# Patient Record
Sex: MALE | Race: WHITE
[De-identification: names, ages, dates, MRNs, and addresses within clinical notes are randomized per-mention and may not be internally consistent; named-entity substitution may affect disease eponyms.]

---

## 2018-03-17 ENCOUNTER — HOSPITAL ENCOUNTER (INPATIENT)
Dept: HOSPITAL 17 - NEPC | Age: 63
LOS: 6 days | Discharge: HOME | DRG: 208 | End: 2018-03-23
Attending: HOSPITALIST | Admitting: HOSPITALIST
Payer: SELF-PAY

## 2018-03-17 VITALS
DIASTOLIC BLOOD PRESSURE: 54 MMHG | OXYGEN SATURATION: 99 % | TEMPERATURE: 91.8 F | HEART RATE: 87 BPM | RESPIRATION RATE: 30 BRPM | SYSTOLIC BLOOD PRESSURE: 100 MMHG

## 2018-03-17 VITALS
SYSTOLIC BLOOD PRESSURE: 100 MMHG | OXYGEN SATURATION: 100 % | HEART RATE: 109 BPM | TEMPERATURE: 94.8 F | RESPIRATION RATE: 27 BRPM | DIASTOLIC BLOOD PRESSURE: 52 MMHG

## 2018-03-17 VITALS
SYSTOLIC BLOOD PRESSURE: 96 MMHG | OXYGEN SATURATION: 93 % | TEMPERATURE: 92.3 F | DIASTOLIC BLOOD PRESSURE: 53 MMHG | RESPIRATION RATE: 30 BRPM | HEART RATE: 87 BPM

## 2018-03-17 VITALS
RESPIRATION RATE: 28 BRPM | TEMPERATURE: 99.1 F | HEART RATE: 102 BPM | OXYGEN SATURATION: 100 % | DIASTOLIC BLOOD PRESSURE: 63 MMHG | SYSTOLIC BLOOD PRESSURE: 127 MMHG

## 2018-03-17 VITALS
TEMPERATURE: 97.6 F | DIASTOLIC BLOOD PRESSURE: 61 MMHG | OXYGEN SATURATION: 100 % | HEART RATE: 96 BPM | SYSTOLIC BLOOD PRESSURE: 121 MMHG | RESPIRATION RATE: 28 BRPM

## 2018-03-17 VITALS
TEMPERATURE: 97.6 F | RESPIRATION RATE: 28 BRPM | OXYGEN SATURATION: 100 % | HEART RATE: 96 BPM | DIASTOLIC BLOOD PRESSURE: 60 MMHG | SYSTOLIC BLOOD PRESSURE: 140 MMHG

## 2018-03-17 VITALS — HEART RATE: 86 BPM

## 2018-03-17 VITALS
OXYGEN SATURATION: 100 % | HEART RATE: 103 BPM | DIASTOLIC BLOOD PRESSURE: 58 MMHG | SYSTOLIC BLOOD PRESSURE: 109 MMHG | RESPIRATION RATE: 28 BRPM | TEMPERATURE: 93.7 F

## 2018-03-17 VITALS — SYSTOLIC BLOOD PRESSURE: 143 MMHG | RESPIRATION RATE: 6 BRPM | DIASTOLIC BLOOD PRESSURE: 78 MMHG | HEART RATE: 100 BPM

## 2018-03-17 VITALS — DIASTOLIC BLOOD PRESSURE: 78 MMHG | SYSTOLIC BLOOD PRESSURE: 143 MMHG | HEART RATE: 92 BPM

## 2018-03-17 VITALS — TEMPERATURE: 91.2 F

## 2018-03-17 VITALS — OXYGEN SATURATION: 100 %

## 2018-03-17 VITALS — BODY MASS INDEX: 23.08 KG/M2 | HEIGHT: 72 IN | WEIGHT: 170.42 LBS

## 2018-03-17 DIAGNOSIS — E11.11: ICD-10-CM

## 2018-03-17 DIAGNOSIS — L55.9: ICD-10-CM

## 2018-03-17 DIAGNOSIS — E87.5: ICD-10-CM

## 2018-03-17 DIAGNOSIS — E11.65: ICD-10-CM

## 2018-03-17 DIAGNOSIS — N17.9: ICD-10-CM

## 2018-03-17 DIAGNOSIS — D72.828: ICD-10-CM

## 2018-03-17 DIAGNOSIS — Z79.4: ICD-10-CM

## 2018-03-17 DIAGNOSIS — D64.9: ICD-10-CM

## 2018-03-17 DIAGNOSIS — I25.2: ICD-10-CM

## 2018-03-17 DIAGNOSIS — D75.89: ICD-10-CM

## 2018-03-17 DIAGNOSIS — Z72.0: ICD-10-CM

## 2018-03-17 DIAGNOSIS — Z91.11: ICD-10-CM

## 2018-03-17 DIAGNOSIS — E11.21: ICD-10-CM

## 2018-03-17 DIAGNOSIS — I45.10: ICD-10-CM

## 2018-03-17 DIAGNOSIS — E87.6: ICD-10-CM

## 2018-03-17 DIAGNOSIS — I10: ICD-10-CM

## 2018-03-17 DIAGNOSIS — I21.A1: ICD-10-CM

## 2018-03-17 DIAGNOSIS — E87.0: ICD-10-CM

## 2018-03-17 DIAGNOSIS — N30.90: ICD-10-CM

## 2018-03-17 DIAGNOSIS — Z91.19: ICD-10-CM

## 2018-03-17 DIAGNOSIS — E87.1: ICD-10-CM

## 2018-03-17 DIAGNOSIS — E72.20: ICD-10-CM

## 2018-03-17 DIAGNOSIS — J96.01: Primary | ICD-10-CM

## 2018-03-17 DIAGNOSIS — E83.39: ICD-10-CM

## 2018-03-17 DIAGNOSIS — G92: ICD-10-CM

## 2018-03-17 DIAGNOSIS — F10.10: ICD-10-CM

## 2018-03-17 LAB
ALBUMIN SERPL-MCNC: 3.9 GM/DL (ref 3.4–5)
ALP SERPL-CCNC: 164 U/L (ref 45–117)
ALT SERPL-CCNC: 56 U/L (ref 12–78)
APAP SERPL-MCNC: (no result) MCG/ML (ref 10–30)
AST SERPL-CCNC: 39 U/L (ref 15–37)
BACTERIA #/AREA URNS HPF: (no result) /HPF
BASOPHILS # BLD AUTO: 0.2 TH/MM3 (ref 0–0.2)
BASOPHILS NFR BLD AUTO: 1 % (ref 0–2)
BASOPHILS NFR BLD: 0.4 % (ref 0–2)
BILIRUB SERPL-MCNC: 0.4 MG/DL (ref 0.2–1)
BUN SERPL-MCNC: 43 MG/DL (ref 7–18)
BUN SERPL-MCNC: 44 MG/DL (ref 7–18)
CALCIUM SERPL-MCNC: 6.9 MG/DL (ref 8.5–10.1)
CALCIUM SERPL-MCNC: 8 MG/DL (ref 8.5–10.1)
CALCIUM TP COR SERPL-MCNC: 7.5 MG/DL (ref 8.5–10.1)
CHLORIDE SERPL-SCNC: 102 MEQ/L (ref 98–107)
CHLORIDE SERPL-SCNC: 89 MEQ/L (ref 98–107)
COLOR UR: (no result)
CREAT SERPL-MCNC: 2.26 MG/DL (ref 0.6–1.3)
CREAT SERPL-MCNC: 2.46 MG/DL (ref 0.6–1.3)
EOSINOPHIL # BLD: 0.1 TH/MM3 (ref 0–0.4)
EOSINOPHIL NFR BLD: 0.3 % (ref 0–4)
ERYTHROCYTE [DISTWIDTH] IN BLOOD BY AUTOMATED COUNT: 14.1 % (ref 11.6–17.2)
GFR SERPLBLD BASED ON 1.73 SQ M-ARVRAT: 27 ML/MIN (ref 89–?)
GFR SERPLBLD BASED ON 1.73 SQ M-ARVRAT: 30 ML/MIN (ref 89–?)
GLUCOSE SERPL-MCNC: 1165 MG/DL (ref 74–106)
GLUCOSE SERPL-MCNC: 709 MG/DL (ref 74–106)
GLUCOSE SERPL-MCNC: 813 MG/DL (ref 74–106)
GLUCOSE UR STRIP-MCNC: 1000 MG/DL
HCO3 BLD-SCNC: (no result) MEQ/L (ref 21–32)
HCO3 BLD-SCNC: 5.1 MEQ/L (ref 21–32)
HCT VFR BLD CALC: 49.2 % (ref 39–51)
HGB BLD-MCNC: 14.2 GM/DL (ref 13–17)
HGB UR QL STRIP: (no result)
INR PPP: 1.1 RATIO
KETONES UR STRIP-MCNC: 150 MG/DL
LACTIC ACID SEPSIS PROTOCOL: 4.5 MMOL/L (ref 0.4–2)
LYMPHOCYTES # BLD AUTO: 8 TH/MM3 (ref 1–4.8)
LYMPHOCYTES NFR BLD AUTO: 19.5 % (ref 9–44)
LYMPHOCYTES: 12 % (ref 9–44)
MAGNESIUM SERPL-MCNC: 2.5 MG/DL (ref 1.5–2.5)
MCH RBC QN AUTO: 32.9 PG (ref 27–34)
MCHC RBC AUTO-ENTMCNC: 28.9 % (ref 32–36)
MCV RBC AUTO: 114 FL (ref 80–100)
MONOCYTE #: 2 TH/MM3 (ref 0–0.9)
MONOCYTES NFR BLD: 5 % (ref 0–8)
MONOCYTES: 9 % (ref 0–8)
MUCOUS THREADS #/AREA URNS LPF: (no result) /LPF
NEUTROPHILS # BLD AUTO: 30.6 TH/MM3 (ref 1.8–7.7)
NEUTROPHILS NFR BLD AUTO: 74.8 % (ref 16–70)
NEUTS BAND # BLD MANUAL: 31.2 TH/MM3 (ref 1.8–7.7)
NEUTS BAND NFR BLD: 8 % (ref 0–6)
NEUTS SEG NFR BLD MANUAL: 68 % (ref 16–70)
NITRITE UR QL STRIP: (no result)
PHOSPHATE SERPL-MCNC: 7.8 MG/DL (ref 2.5–4.9)
PLATELET # BLD: 503 TH/MM3 (ref 150–450)
PMV BLD AUTO: 7.8 FL (ref 7–11)
PROT SERPL-MCNC: 6 GM/DL (ref 6.4–8.2)
PROT SERPL-MCNC: 6.9 GM/DL (ref 6.4–8.2)
PROTHROMBIN TIME: 11.4 SEC (ref 9.8–11.6)
RBC # BLD AUTO: 4.31 MIL/MM3 (ref 4.5–5.9)
SODIUM SERPL-SCNC: 127 MEQ/L (ref 136–145)
SODIUM SERPL-SCNC: 135 MEQ/L (ref 136–145)
SP GR UR STRIP: 1.02 (ref 1–1.03)
TROPONIN I SERPL-MCNC: 0.04 NG/ML (ref 0.02–0.05)
TROPONIN I SERPL-MCNC: 0.09 NG/ML (ref 0.02–0.05)
URINE LEUKOCYTE ESTERASE: (no result)
WBC # BLD AUTO: 41 TH/MM3 (ref 4–11)

## 2018-03-17 PROCEDURE — 86403 PARTICLE AGGLUT ANTBDY SCRN: CPT

## 2018-03-17 PROCEDURE — 84155 ASSAY OF PROTEIN SERUM: CPT

## 2018-03-17 PROCEDURE — 94150 VITAL CAPACITY TEST: CPT

## 2018-03-17 PROCEDURE — 82805 BLOOD GASES W/O2 SATURATION: CPT

## 2018-03-17 PROCEDURE — 85007 BL SMEAR W/DIFF WBC COUNT: CPT

## 2018-03-17 PROCEDURE — 76705 ECHO EXAM OF ABDOMEN: CPT

## 2018-03-17 PROCEDURE — 85027 COMPLETE CBC AUTOMATED: CPT

## 2018-03-17 PROCEDURE — 87449 NOS EACH ORGANISM AG IA: CPT

## 2018-03-17 PROCEDURE — 84132 ASSAY OF SERUM POTASSIUM: CPT

## 2018-03-17 PROCEDURE — 95819 EEG AWAKE AND ASLEEP: CPT

## 2018-03-17 PROCEDURE — 0BH17EZ INSERTION OF ENDOTRACHEAL AIRWAY INTO TRACHEA, VIA NATURAL OR ARTIFICIAL OPENING: ICD-10-PCS | Performed by: EMERGENCY MEDICINE

## 2018-03-17 PROCEDURE — 84443 ASSAY THYROID STIM HORMONE: CPT

## 2018-03-17 PROCEDURE — 85610 PROTHROMBIN TIME: CPT

## 2018-03-17 PROCEDURE — 93306 TTE W/DOPPLER COMPLETE: CPT

## 2018-03-17 PROCEDURE — 31500 INSERT EMERGENCY AIRWAY: CPT

## 2018-03-17 PROCEDURE — 82010 KETONE BODYS QUAN: CPT

## 2018-03-17 PROCEDURE — 87086 URINE CULTURE/COLONY COUNT: CPT

## 2018-03-17 PROCEDURE — 74176 CT ABD & PELVIS W/O CONTRAST: CPT

## 2018-03-17 PROCEDURE — 82552 ASSAY OF CPK IN BLOOD: CPT

## 2018-03-17 PROCEDURE — 85025 COMPLETE CBC W/AUTO DIFF WBC: CPT

## 2018-03-17 PROCEDURE — 36556 INSERT NON-TUNNEL CV CATH: CPT

## 2018-03-17 PROCEDURE — 85384 FIBRINOGEN ACTIVITY: CPT

## 2018-03-17 PROCEDURE — 87040 BLOOD CULTURE FOR BACTERIA: CPT

## 2018-03-17 PROCEDURE — 5A1945Z RESPIRATORY VENTILATION, 24-96 CONSECUTIVE HOURS: ICD-10-PCS | Performed by: EMERGENCY MEDICINE

## 2018-03-17 PROCEDURE — 82948 REAGENT STRIP/BLOOD GLUCOSE: CPT

## 2018-03-17 PROCEDURE — 71045 X-RAY EXAM CHEST 1 VIEW: CPT

## 2018-03-17 PROCEDURE — 94640 AIRWAY INHALATION TREATMENT: CPT

## 2018-03-17 PROCEDURE — 87641 MR-STAPH DNA AMP PROBE: CPT

## 2018-03-17 PROCEDURE — 82533 TOTAL CORTISOL: CPT

## 2018-03-17 PROCEDURE — 82140 ASSAY OF AMMONIA: CPT

## 2018-03-17 PROCEDURE — 84300 ASSAY OF URINE SODIUM: CPT

## 2018-03-17 PROCEDURE — 82947 ASSAY GLUCOSE BLOOD QUANT: CPT

## 2018-03-17 PROCEDURE — 83036 HEMOGLOBIN GLYCOSYLATED A1C: CPT

## 2018-03-17 PROCEDURE — 85730 THROMBOPLASTIN TIME PARTIAL: CPT

## 2018-03-17 PROCEDURE — 84439 ASSAY OF FREE THYROXINE: CPT

## 2018-03-17 PROCEDURE — 87070 CULTURE OTHR SPECIMN AEROBIC: CPT

## 2018-03-17 PROCEDURE — 80048 BASIC METABOLIC PNL TOTAL CA: CPT

## 2018-03-17 PROCEDURE — 76937 US GUIDE VASCULAR ACCESS: CPT

## 2018-03-17 PROCEDURE — 83605 ASSAY OF LACTIC ACID: CPT

## 2018-03-17 PROCEDURE — 80061 LIPID PANEL: CPT

## 2018-03-17 PROCEDURE — 87804 INFLUENZA ASSAY W/OPTIC: CPT

## 2018-03-17 PROCEDURE — 82150 ASSAY OF AMYLASE: CPT

## 2018-03-17 PROCEDURE — 4A133B1 MONITORING OF ARTERIAL PRESSURE, PERIPHERAL, PERCUTANEOUS APPROACH: ICD-10-PCS | Performed by: INTERNAL MEDICINE

## 2018-03-17 PROCEDURE — 87205 SMEAR GRAM STAIN: CPT

## 2018-03-17 PROCEDURE — 83735 ASSAY OF MAGNESIUM: CPT

## 2018-03-17 PROCEDURE — 04HY32Z INSERTION OF MONITORING DEVICE INTO LOWER ARTERY, PERCUTANEOUS APPROACH: ICD-10-PCS | Performed by: INTERNAL MEDICINE

## 2018-03-17 PROCEDURE — 94664 DEMO&/EVAL PT USE INHALER: CPT

## 2018-03-17 PROCEDURE — 84484 ASSAY OF TROPONIN QUANT: CPT

## 2018-03-17 PROCEDURE — 93005 ELECTROCARDIOGRAM TRACING: CPT

## 2018-03-17 PROCEDURE — 81001 URINALYSIS AUTO W/SCOPE: CPT

## 2018-03-17 PROCEDURE — 70450 CT HEAD/BRAIN W/O DYE: CPT

## 2018-03-17 PROCEDURE — 82550 ASSAY OF CK (CPK): CPT

## 2018-03-17 PROCEDURE — 83690 ASSAY OF LIPASE: CPT

## 2018-03-17 PROCEDURE — 4A133J1 MONITORING OF ARTERIAL PULSE, PERIPHERAL, PERCUTANEOUS APPROACH: ICD-10-PCS | Performed by: INTERNAL MEDICINE

## 2018-03-17 PROCEDURE — 05HN33Z INSERTION OF INFUSION DEVICE INTO LEFT INTERNAL JUGULAR VEIN, PERCUTANEOUS APPROACH: ICD-10-PCS | Performed by: INTERNAL MEDICINE

## 2018-03-17 PROCEDURE — 94002 VENT MGMT INPAT INIT DAY: CPT

## 2018-03-17 PROCEDURE — 36600 WITHDRAWAL OF ARTERIAL BLOOD: CPT

## 2018-03-17 PROCEDURE — 86022 PLATELET ANTIBODIES: CPT

## 2018-03-17 PROCEDURE — 80307 DRUG TEST PRSMV CHEM ANLYZR: CPT

## 2018-03-17 PROCEDURE — 82570 ASSAY OF URINE CREATININE: CPT

## 2018-03-17 PROCEDURE — 80053 COMPREHEN METABOLIC PANEL: CPT

## 2018-03-17 PROCEDURE — 84100 ASSAY OF PHOSPHORUS: CPT

## 2018-03-17 PROCEDURE — 94003 VENT MGMT INPAT SUBQ DAY: CPT

## 2018-03-17 PROCEDURE — 80074 ACUTE HEPATITIS PANEL: CPT

## 2018-03-17 RX ADMIN — TAZOBACTAM SODIUM AND PIPERACILLIN SODIUM SCH MLS/HR: 250; 2 INJECTION, SOLUTION INTRAVENOUS at 20:49

## 2018-03-17 RX ADMIN — THIAMINE HYDROCHLORIDE SCH MLS/HR: 100 INJECTION, SOLUTION INTRAMUSCULAR; INTRAVENOUS at 20:53

## 2018-03-17 RX ADMIN — FAMOTIDINE SCH MG: 10 INJECTION, SOLUTION INTRAVENOUS at 20:48

## 2018-03-17 RX ADMIN — IPRATROPIUM BROMIDE AND ALBUTEROL SULFATE SCH AMPULE: .5; 3 SOLUTION RESPIRATORY (INHALATION) at 23:30

## 2018-03-17 RX ADMIN — TAZOBACTAM SODIUM AND PIPERACILLIN SODIUM SCH MLS/HR: 250; 2 INJECTION, SOLUTION INTRAVENOUS at 13:51

## 2018-03-17 RX ADMIN — Medication SCH ML: at 20:47

## 2018-03-17 RX ADMIN — CHLORHEXIDINE GLUCONATE SCH PACK: 500 CLOTH TOPICAL at 20:53

## 2018-03-17 RX ADMIN — WATER SCH ML: 1 IRRIGANT IRRIGATION at 20:48

## 2018-03-17 RX ADMIN — PHENYLEPHRINE HYDROCHLORIDE PRN MLS/HR: 10 INJECTION INTRAVENOUS at 17:03

## 2018-03-17 RX ADMIN — STANDARDIZED SENNA CONCENTRATE AND DOCUSATE SODIUM SCH TAB: 8.6; 5 TABLET, FILM COATED ORAL at 20:49

## 2018-03-17 RX ADMIN — IPRATROPIUM BROMIDE AND ALBUTEROL SULFATE SCH AMPULE: .5; 3 SOLUTION RESPIRATORY (INHALATION) at 20:23

## 2018-03-17 RX ADMIN — CHLORHEXIDINE GLUCONATE 0.12% ORAL RINSE SCH ML: 1.2 LIQUID ORAL at 20:51

## 2018-03-17 RX ADMIN — PHENYTOIN SODIUM SCH MLS/HR: 50 INJECTION INTRAMUSCULAR; INTRAVENOUS at 12:50

## 2018-03-17 RX ADMIN — PHENYTOIN SODIUM SCH MLS/HR: 50 INJECTION INTRAMUSCULAR; INTRAVENOUS at 13:26

## 2018-03-17 RX ADMIN — MIDAZOLAM HYDROCHLORIDE PRN MLS/HR: 5 INJECTION, SOLUTION INTRAMUSCULAR; INTRAVENOUS at 17:07

## 2018-03-17 RX ADMIN — SODIUM BICARBONATE SCH MLS/HR: 84 INJECTION, SOLUTION INTRAVENOUS at 22:26

## 2018-03-17 RX ADMIN — DEXTROSE MONOHYDRATE PRN MLS/HR: 10 INJECTION, SOLUTION INTRAVENOUS at 13:47

## 2018-03-17 RX ADMIN — POLYVINYL ALCOHOL SCH DROP: 14 SOLUTION/ DROPS OPHTHALMIC at 18:00

## 2018-03-17 RX ADMIN — TAZOBACTAM SODIUM AND PIPERACILLIN SODIUM SCH MLS/HR: 250; 2 INJECTION, SOLUTION INTRAVENOUS at 13:45

## 2018-03-17 NOTE — PD.PROCEDR
Procedure Note


Procedure


DATE: 3/17/18





PROCEDURE: Right femoral arterial catheter placement





INDICATION: Hemodynamic access





DETAILS OF PROCEDURE


The patient was placed in supine  position.  The skin was cleansed with 

Chloraprep.   Additional barrier precautions included large sterile drape, 

sterile gloves, sterile gown,  face mask, and hat. 1% lidocaine was used for 

local anesthesia.  Under direct ultrasound guidance and on the initial attempt, 

the artery was accessed with an introducer needle. The guide wire was advanced. 

Using Seldinger technique 20 gauge arterial catheter was placed.  The guide 

wire was removed.  The catheter was connected to a transducer line and flushed 

with saline. The video monitor displayed normal arterial wave forms. The 

catheter was secured with 2-0 silk. A sterile dressing with antibiotic disc was 

applied.   





ESTIMATED BLOOD LOSS: minimal





COMPLICATIONS:  None











Avel Moya MD Mar 17, 2018 15:31

## 2018-03-17 NOTE — RADRPT
EXAM DATE/TIME:  03/17/2018 13:50 

 

[facility] COMPARISON:     

No previous studies available for comparison.

        

 

 

INDICATIONS :     

Enlarged liver. 

                     

 

MEDICAL HISTORY :           

Diabetes. Tobacco use. 

 

SURGICAL HISTORY :     

None.     

 

ENCOUNTER:     

Initial

 

ACUITY:     

1 day

 

PAIN SCORE:     

Nonresponsive.

 

LOCATION:     

Bilateral upper quadrant 

                     

MEASUREMENTS:     

 

LIVER:     

16.9 cm length 

 

COMMON DUCT:     

5 mm

 

RIGHT KIDNEY:     

13.1 x 6.1 x 5.4 cm

 

SPLEEN:     

11.6 cm length

 

FINDINGS:     

 

LIVER:     

Normal echotexture without focal lesion or ductal dilatation.  

 

COMMON DUCT:     

No intraluminal mass or stone visualized.  

 

GALLBLADDER:     

Contains no stones, demonstrates no wall thickening or pericholecystic fluid.  

 

PANCREAS:     

The visualized portions are within normal limits.  

 

KIDNEY:     

Right kidney is normal in size and echogenicity without evidence of hydronephrosis or concerning mass
. The left kidney demonstrates 2 large cysts with the largest cyst identified within the lower pole m
easuring 9.0 x 9.7 x 6.5 cm. There is a smaller well-circumscribed cyst at the midpole measuring 5.6 
cm in greatest dimension. No evidence of hydronephrosis.

 

SPLEEN:     

No focal lesion.  

 

 

CONCLUSION:     

The liver is normal in size and echogenicity. No evidence of mass. There are large but benign-appeari
ng left renal cyst present..

 

 

 

 Kori Hernandez MD on March 17, 2018 at 14:17           

Board Certified Radiologist.

 This report was verified electronically.

## 2018-03-17 NOTE — HHI.HP
[facility]


Service


Critical Care Medicine


Primary Care Physician


Unknown


Admission Diagnosis





DKA, metabolic acidosis, metabolic encephalopathy


Diagnosis:  


(1) Acute respiratory failure


Diagnosis:  Principal





(2) Thrombocytosis


Diagnosis:  Secondary





(3) Macrocytosis


Diagnosis:  Secondary





(4) Increased ammonia level


Diagnosis:  Principal





(5) Leukocytosis


Diagnosis:  Principal





(6) Glycosuria


Diagnosis:  Secondary





(7) Acute metabolic encephalopathy


Diagnosis:  Principal





(8) Cystitis


Diagnosis:  Principal





(9) Acute kidney injury


Diagnosis:  Principal





(10) Hyperkalemia


Diagnosis:  Principal





(11) Hyponatremia


Diagnosis:  Secondary





(12) Hyperglycemia due to type 2 diabetes mellitus


Diagnosis:  Principal





Chief Complaint:  


Found in hotel room by friend, unresponsive


Travel History


International Travel<30 Days:  No


Contact w/Intl Traveler <30 Da:  No


Traveled to Known Affected Are:  No


History of Present Illness


62-year-old  male.  Date of admission 3/17/2018.  Past medical history 

includes diabetes according to friend per ER physician who is currently 

unavailable.  This individual presented to the emergency room by EMS after he 

was found unresponsive this morning by his friend in the hotel room.   When EMS 

arrived and checked his blood sugar the meter read high.  They brought him 

unresponsive with GCS of 3.  Vital signs were otherwise stable.  Patient was in 

no condition to give any meaningful history.  The bedside blood sugar in the 

emergency room read high as well.  There is no family or friend currently with 

the patient.  He was last seen normal last night. 





Patient received 20 mg etomidate and 100 mg succinylcholine was extubated with 

a 7.5 ET tube.  CT brain revealed no acute intracranial findings.  His white 

blood cell count is 41,000.  He has a macrocytosis on his lab draws ammonia 

levels 161.  Received  vancomycin and piperacillin/tazobactam in the emergency 

department.  He was started on lactulose 30 cc 4 times daily.  EKG revealed 

normal sinus rhythm 83.  Incomplete right bundle branch block.  Troponin is 

currently pending.  Creatinine was 2.4.  Potassium is 6.3.  Sodium was 127.  

Troponin 0 0.04.  TSH 1.19.  Patient received 10 mg insulin R and started on a 

drip currently 8 units an hour.  3 L normal saline wide open.  Currently normal 

saline at 250 cc now.  We are asked to admit.





Review of Systems


ROS Limitations:  Intubated





Past Family Social History


Allergies:  


Coded Allergies:  


     No Allergy Information Available (Unverified , 3/17/18)


Past Medical History


Diabetes mellitus


Past Surgical History


Unknown


Reported Medications


Unknown


Active Ordered Medications


Reviewed in EMR


Family History


Unknown


Social History


Unknown





Physical Exam


Vital Signs





Vital Signs








  Date Time  Temp Pulse Resp B/P (MAP) Pulse Ox O2 Delivery O2 Flow Rate FiO2


 


3/17/18 12:16  92  143/78 (99)    


 


3/17/18 12:13  100 6 143/78 (99)  Non-Rebreather  


 


3/17/18 12:11        100








Physical Exam


GENERAL: 62-year-old  male currently orotracheally intubated


SKIN: Warm and dry.  Face and upper extremity are sunburned


HEAD: Atraumatic. Normocephalic. 


EYES: Pupils equal and round about 4 mm bilaterally and reactive. No scleral 

icterus. No injection or drainage. 


ENT: No nasal bleeding or discharge.  Mucous membranes pink and moist.


NECK: Trachea midline. No JVD. 


CARDIOVASCULAR: Regular rate and rhythm.  S1, S2.  No S4.  No murmur


RESPIRATORY: Tachypneic, using accessory muscles.  No wheezing appreciated.


GASTROINTESTINAL: Abdomen soft, non-tender, nondistended.  Hypoactive bowel 

sounds are appreciated


MUSCULOSKELETAL: Extremities without significant peripheral edema. No obvious 

deformities. 


NEUROLOGICAL: Cranial nerves II through XII grossly intact.  Positive gag and 

corneal reflex.  Positive cough.  Currently without withdrawing to pain.


Laboratory





Laboratory Tests








Test


  3/17/18


12:15 3/17/18


12:25


 


Blood Gas Puncture Site IV  


 


Blood Gas Patient Temperature 98.6  


 


Blood Gas HCO3 3  


 


Blood Gas Base Excess -29.0  


 


Blood Gas Oxygen Saturation 85  


 


Arterial Blood pH 6.71  


 


Arterial Blood Partial


Pressure CO2 22 


  


 


 


Arterial Blood Partial


Pressure O2 72 


  


 


 


Arterial Blood Oxygen Content 17.9  


 


Arterial Blood


Carboxyhemoglobin 0.9 


  


 


 


Arterial Blood Methemoglobin 1.3  


 


Venous Blood pH 6.71  


 


Venous Blood Partial Pressure


CO2 22 


  


 


 


Venous Blood Partial Pressure


O2 72 


  


 


 


Venous Blood HCO3 3  


 


Venous Blood Oxygen Saturation 85  


 


Venous Blood Oxygen Content 17.9  


 


Venous Blood Base Excess -29.0  


 


Blood Gas Hemoglobin 15.0  


 


Oxygen Delivery Device BiPAP  


 


Blood Gas Liter Flow 15  


 


Blood Gas Ventilator Setting NR  


 


White Blood Count  41.0 


 


Red Blood Count  4.31 


 


Hemoglobin  14.2 


 


Hematocrit  49.2 


 


Mean Corpuscular Volume  114.0 


 


Mean Corpuscular Hemoglobin  32.9 


 


Mean Corpuscular Hemoglobin


Concent 


  28.9 


 


 


Red Cell Distribution Width  14.1 


 


Platelet Count  503 


 


Mean Platelet Volume  7.8 


 


Neutrophils (%) (Auto)  74.8 


 


Lymphocytes (%) (Auto)  19.5 


 


Monocytes (%) (Auto)  5.0 


 


Eosinophils (%) (Auto)  0.3 


 


Basophils (%) (Auto)  0.4 


 


Neutrophils # (Auto)  30.6 


 


Lymphocytes # (Auto)  8.0 


 


Monocytes # (Auto)  2.0 


 


Eosinophils # (Auto)  0.1 


 


Basophils # (Auto)  0.2 


 


CBC Comment  AUTO DIFF 


 


Differential Total Cells


Counted 


  100 


 


 


Neutrophils % (Manual)  68 


 


Band Neutrophils %  8 


 


Lymphocytes %  12 


 


Monocytes %  9 


 


Eosinophils %  2 


 


Basophils %  1 


 


Neutrophils # (Manual)  31.2 


 


Differential Comment


  


  FINAL DIFF


MANUAL


 


Platelet Estimate  HIGH 


 


Platelet Morphology Comment  NORMAL 


 


Prothrombin Time  11.4 


 


Prothromb Time International


Ratio 


  1.1 


 


 


Urine Color  LIGHT-YELLOW 


 


Urine Turbidity  CLEAR 


 


Urine pH  5.0 


 


Urine Specific Gravity  1.022 


 


Urine Protein  30 


 


Urine Glucose (UA)  1000 


 


Urine Ketones  150 


 


Urine Occult Blood  SMALL 


 


Urine Nitrite  NEG 


 


Urine Bilirubin  NEG 


 


Urine Urobilinogen  LESS THAN 2.0 


 


Urine Leukocyte Esterase  NEG 


 


Urine RBC  1 


 


Urine WBC  1 


 


Urine Bacteria  RARE 


 


Urine Mucus  FEW 


 


Microscopic Urinalysis Comment


  


  CATH-CULTURE


IND


 


Ammonia  161 


 


Salicylates Level  5.9 














 Date/Time


Source Procedure


Growth Status


 


 


 3/17/18 12:25


Blood Peripheral Aerobic Blood Culture


Pending Received


 


 3/17/18 12:25


Blood Peripheral Anaerobic Blood Culture


Pending Received


 


 3/17/18 12:25


Urine Clean Catch Urine Culture


Pending Received








Result Diagram:  


3/17/18 1225





Imaging


CT brain -no acute intracranial findings


Chest x-ray -ET tube high with advanced 2 cm.  NG tube in place.  No acute 

pulmonary findings.





Septic Shock Reassessment


Septic shock perfusion:  reassessment completed





Caprini VTE Risk Assessment


Caprini VTE Risk Assessment:  Mod/High Risk (score >= 2)


Caprini Risk Assessment Model











 Point Value = 1          Point Value = 2  Point Value = 3  Point Value = 5


 


Age 41-60


Minor surgery


BMI > 25 kg/m2


Swollen legs


Varicose veins


Pregnancy or postpartum


History of unexplained or recurrent


   spontaneous 


Oral contraceptives or hormone


   replacement


Sepsis (< 1 month)


Serious lung disease, including


   pneumonia (< 1 month)


Abnormal pulmonary function


Acute myocardial infarction


Congestive heart failure (< 1 month)


History of inflammatory bowel disease


Medical patient at bed rest Age 61-74


Arthroscopic surgery


Major open surgery (> 45 min)


Laparoscopic surgery (> 45 min)


Malignancy


Confined to bed (> 72 hours)


Immobilizing plaster cast


Central venous access Age >= 75


History of VTE


Family history of VTE


Factor V Leiden


Prothrombin 22658E


Lupus anticoagulant


Anticardiolipin antibodies


Elevated serum homocysteine


Heparin-induced thrombocytopenia


Other congenital or acquired


   thrombophilia Stroke (< 1 month)


Elective arthroplasty


Hip, pelvis, or leg fracture


Acute spinal cord injury (< 1 month)








Prophylaxis Regimen











   Total Risk


Factor Score Risk Level Prophylaxis Regimen


 


0-1      Low Early ambulation


 


2 Moderate Order ONE of the following:


*Sequential Compression Device (SCD)


*Heparin 5000 units SQ BID


 


3-4 Higher Order ONE of the following medications:


*Heparin 5000 units SQ TID


*Enoxaparin/Lovenox 40 mg SQ daily (WT < 150 kg, CrCl > 30 mL/min)


*Enoxaparin/Lovenox 30 mg SQ daily (WT < 150 kg, CrCl > 10-29 mL/min)


*Enoxaparin/Lovenox 30 mg SQ BID (WT < 150 kg, CrCl > 30 mL/min)


AND/OR


*Sequential Compression Device (SCD)


 


5 or more Highest Order ONE of the following medications:


*Heparin 5000 units SQ TID (Preferred with Epidurals)


*Enoxaparin/Lovenox 40 mg SQ daily (WT < 150 kg, CrCl > 30 mL/min)


*Enoxaparin/Lovenox 30 mg SQ daily (WT < 150 kg, CrCl > 10-29 mL/min)


*Enoxaparin/Lovenox 30 mg SQ BID (WT < 150 kg, CrCl > 30 mL/min)


AND


*Sequential Compression Device (SCD)











Assessment and Plan


Assessment and Plan


Neuro/Psych:





Acute toxic metabolic encephalopathy secondary hyperglycemia and elevated 

ammonia





Currently on propofol/fentanyl drips for sedation/analgesia while intubated


Goal of RA SS of -2


Daily sedation vacation


CT brain 3/17 revealed no acute intracranial findings


Ammonia level is 161.  See GI


Thiamine 100 mg IV daily, folate 1 mg daily multiple tablet daily with 

macrocytosis


 


CV: 





Hypertension





Currently on normal saline at 250 cc an hour


Currently not requiring vasopressors and/or antihypertensives


Lactate is currently pending








Resp: 





Acute hypoxemic respiratory failure





PRVC 30/500/0.75/5/100


Ventilator bundle


Albuterol/ipratropium aerosols every 6 hours with albuterol aerosols every 2 

hours needed for dyspnea


Spontaneous breathing trials when clinically indicated


Chest x-ray post intubation reveals no acute cardiopulmonary findings





GI: 





Hyperammonia


Elevated AST





N.p.o.


NGT to LIWS


Famotidine for GI prophylaxis


Docusate sodium/senna 1 tablet twice daily for bowel regimen


Lactulose 30 cc every 6 hours for elevated ammonia level


Liver ultrasound ordered.  Hepatitis panel ordered.  CPK ordered





:  





Guerrero catheter has been placed for accurate I's and O's in a critically ill 

patient








Endo:  





Hyperglycemia





Received 10 minutes to our insulin ED.  Currently on insulin drip at 8 units an 

hour.  Goal increased blood sugar by  every hour


BMP, mag and proximal every 6 hours with acetone every 12 hours


Attempt to reconcile home medications


Continue normal saline at 250 cc an hour.  Transition to D5 one half normal 

saline at 200 cc an hour once acetone cleared





Renal: 





Glycosuria


Proteinuria


Acute kidney injury





Patient currently on aggressive crystalloid resuscitation


Check urine eosinophils and electrolytes





Heme:





Leukocytosis neutrophil predominant


Macrocytosis


Thrombocytosis





Monitor CBC daily.  Follow trends








ID:





Cystitis





Currently piperacillin/tazobactam day #1


Received 1 dose of vancomycin ED





Blood cultures 2, urine culture 3/17 pending





FEN:





Pseudohyponatremia


Hyperkalemia





Replace electrolytes as clinically indicated


Recheck potassium in 6 hours.  Along with magnesium and phosphorus





MSK:





PT evaluate and treat





Access


-Utilize peripheral IV.  Central line if indicated





Prophylaxis


-GI -famotidine


-DVT -SCD/heparin subcu





Critical Care:


The total critical care time was 35 minutes. Time to perform other separately 

billable procedures was not included in the critical care time.


Code Status


Full code


Discussed Condition With


ED physician Dr. Torres.  No other family or friends available.  Care plan 

discussed and all questions answered.





Problem Qualifiers





(1) Acute respiratory failure:  


Qualified Codes:  J96.00 - Acute respiratory failure, unspecified whether with 

hypoxia or hypercapnia


(2) Leukocytosis:  


Qualified Codes:  D72.828 - Other elevated white blood cell count


(3) Hyperglycemia due to type 2 diabetes mellitus:  


Qualified Codes:  E11.65 - Type 2 diabetes mellitus with hyperglycemia








Avel Moya MD Mar 17, 2018 13:23

## 2018-03-17 NOTE — PD
HPI


Chief Complaint:  Altered Mental Status


Time Seen by Provider:  12:17


Travel History


International Travel<30 days:  No


Contact w/Intl Traveler<30days:  No


Traveled to known affect area:  No





History of Present Illness


HPI


62-year-old male was brought to the emergency room by EMS after he was found 

unresponsive this morning by his friend in the hotel room.  Patient is from out 

of town and is here for the bike week.  He has history of diabetes.  When EMS 

arrived and checked his blood sugar the meter read high.  They brought him 

unresponsive with GCS of 3.  Vital signs were otherwise stable.  Patient was in 

no condition to give any meaningful history.  The bedside blood sugar in the 

emergency room read high as well.  There is no family or friend currently with 

the patient.  He was last seen normal last night.  No known history of abnormal 

alcohol intake or illicit drugs.





[facility]


Past Medical History


*** Narrative Medical


List of his past medical, surgical, social and family history reviewed from the 

nursing note.





Social History


Tobacco Use:  Yes





Allergies-Medications


(Allergen,Severity, Reaction):  


Coded Allergies:  


     No Allergy Information Available (Unverified , 3/17/18)


Comments


Unknown


Narrative Medication


Unknown





Review of Systems


ROS Limitations:  Unresponsive


Except as stated in HPI:  all other systems reviewed are Neg





Physical Exam


Exam Limitations:  Altered Mental Status


Narrative


GENERAL: Unresponsive, significant


SKIN: Focused skin assessment warm/dry.


HEAD: Atraumatic. Normocephalic. 


EYES: Pupils equal and round, 4 mm equal and reactive to light. No scleral 

icterus. No injection or drainage. 


ENT: No nasal bleeding or discharge.  Dry mucous membrane and tongue


NECK: Trachea midline. No JVD. 


CARDIOVASCULAR: Regular rate and rhythm.  No murmur appreciated.


RESPIRATORY: No accessory muscle use.  Tachypneic.  Clear to auscultation. 

Breath sounds equal bilaterally. 


GASTROINTESTINAL: Abdomen soft, non-tender, nondistended. Hepatic and splenic 

margins not palpable. 


MUSCULOSKELETAL: No obvious deformities. No clubbing.  No cyanosis.  No edema. 


NEUROLOGICAL: GCS of 3


PSYCHIATRIC: Unable to assess





Data


Data


Last Documented VS





Orders





 Orders


Propofol 500 Mg/50 Ml Inj (Diprivan 500 (3/17/18 12:11)


Electrocardiogram (3/17/18 12:17)


Ammonia (3/17/18 12:17)


Complete Blood Count With Diff (3/17/18 12:17)


Comprehensive Metabolic Panel (3/17/18 12:17)


Creatine Kinase (Cpk) (3/17/18 12:17)


Prothrombin Time / Inr (Pt) (3/17/18 12:17)


Troponin I (3/17/18 12:17)


Thyroid Stimulating Hormone (3/17/18 12:17)


Urinalysis - C+S If Indicated (3/17/18 12:17)


Lactic Acid Sepsis Protocol (3/17/18 12:17)


Arterial Blood Gas (Abg) (3/17/18 12:17)


Blood Culture (3/17/18 12:17)


Chest, Single Ap (3/17/18 12:17)


Ct Brain W/O Iv Contrast(Rout) (3/17/18 12:17)


Blood Glucose (3/17/18 12:17)


Ecg Monitoring (3/17/18 12:17)


Iv Access Insert/Monitor (3/17/18 12:17)


Oximetry (3/17/18 12:17)


Sodium Chloride 0.9% Flush (Ns Flush) (3/17/18 12:30)


Sodium Chlor 0.9% 1000 Ml Inj (Ns 1000 M (3/17/18 12:17)


Drug Screen, Random Urine (3/17/18 12:17)


Alcohol (Ethanol) (3/17/18 12:17)


Tylenol (Acetaminophen) (3/17/18 12:17)


Salicylates (Aspirin) (3/17/18 12:17)


Sodium Chlor 0.9% 1000 Ml Inj (Ns 1000 M (3/17/18 12:30)


Blood Gas Venous (Vbg) (3/17/18 12:17)


Insulin Human Regular Inj (Novolin R Inj (3/17/18 12:30)


Urinary Catheter Insert/Apply (3/17/18 12:19)


^ Orogastric Tube (3/17/18 12:19)


Succinylcholine Inj (Quelicin Inj) (3/17/18 12:30)


Etomidate Inj (Amidate Inj) (3/17/18 12:30)


Propofol 1000 Mg/100 Ml Inj (Diprivan 10 (3/17/18 12:30)


Insert Temp Sensing Guerrero Cath (3/17/18 12:26)


Cardiac Monitor / Telemetry [id number] (3/17/18 12:26)


^ Insert Iv (3/17/18 12:26)


Diet Npo (3/17/18 Lunch)


Sodium Chlor 0.9% 1000 Ml Inj (Ns 1000 M (3/17/18 12:26)


Sodium Chlor 0.9% 1000 Ml Inj (Ns 1000 M (3/17/18 12:26)


Dext 5%-Nacl 0.9% 1000 Ml Inj (D5w-Ns 10 (3/17/18 12:26)


Insulin Regular (Iv Infusion) (Novolin R (3/17/18 13:00)


Potassium Chlor 40 Meq Premix (Kcl 40 Me (3/17/18 12:30)


Potassium Chlor 40 Meq Premix (Kcl 40 Me (3/17/18 12:30)


Potassium Chlor 20 Meq Premix (Kcl 20 Me (3/17/18 12:30)


Sodium Bicarbonate 8.4% Inj (Sodium Bica (3/17/18 12:30)


Sodium Bicarbonate 8.4% Inj (Sodium Bica (3/17/18 12:30)


Hemoglobin (Hgb) A1c (3/17/18 12:26)


Basic Metabolic Panel (Bmp) (3/17/18 17:26)


Basic Metabolic Panel (Bmp) (3/17/18 23:26)


Basic Metabolic Panel (Bmp) (3/18/18 05:26)


Basic Metabolic Panel (Bmp) (3/18/18 11:26)


Magnesium (Mg) (3/17/18 17:26)


Magnesium (Mg) (3/17/18 23:26)


Magnesium (Mg) (3/18/18 05:26)


Magnesium (Mg) (3/18/18 11:26)


Phosphorus (Po4) (3/17/18 17:26)


Phosphorus (Po4) (3/17/18 23:26)


Phosphorus (Po4) (3/18/18 05:26)


Phosphorus (Po4) (3/18/18 11:26)


Beta Hydroxybutyrate (Acetone) (3/17/18 23:26)


Beta Hydroxybutyrate (Acetone) (3/18/18 11:26)


Admit Order (Ed Use Only) (3/17/18 12:37)


Protein Corrected Calcium(Pcc) (3/17/18 15:05)





Labs





Laboratory Tests








Test


  3/17/18


12:15 3/17/18


12:25


 


Blood Gas Puncture Site IV  


 


Blood Gas Patient Temperature 98.6  


 


Blood Gas HCO3 3 mmol/L  


 


Blood Gas Base Excess -29.0 mmol/L  


 


Blood Gas Oxygen Saturation 85 %  


 


Arterial Blood pH 6.71  


 


Arterial Blood Partial


Pressure CO2 22 mmHg 


  


 


 


Arterial Blood Partial


Pressure O2 72 mmHG 


  


 


 


Arterial Blood Oxygen Content 17.9 Vol %  


 


Arterial Blood


Carboxyhemoglobin 0.9 % 


  


 


 


Arterial Blood Methemoglobin 1.3 %  


 


Venous Blood pH 6.71  


 


Venous Blood Partial Pressure


CO2 22 mmHg 


  


 


 


Venous Blood Partial Pressure


O2 72 mmHg 


  


 


 


Venous Blood HCO3 3 mmol/L  


 


Venous Blood Oxygen Saturation 85 %  


 


Venous Blood Oxygen Content 17.9 Vol %  


 


Venous Blood Base Excess -29.0 mmol/L  


 


Blood Gas Hemoglobin 15.0 G/DL  


 


Oxygen Delivery Device BiPAP  


 


Blood Gas Liter Flow 15 L/M  


 


Blood Gas Ventilator Setting NR  


 


White Blood Count  41.0 TH/MM3 


 


Red Blood Count  4.31 MIL/MM3 


 


Hemoglobin  14.2 GM/DL 


 


Hematocrit  49.2 % 


 


Mean Corpuscular Volume  114.0 FL 


 


Mean Corpuscular Hemoglobin  32.9 PG 


 


Mean Corpuscular Hemoglobin


Concent 


  28.9 % 


 


 


Red Cell Distribution Width  14.1 % 


 


Platelet Count  503 TH/MM3 


 


Mean Platelet Volume  7.8 FL 


 


Neutrophils (%) (Auto)  74.8 % 


 


Lymphocytes (%) (Auto)  19.5 % 


 


Monocytes (%) (Auto)  5.0 % 


 


Eosinophils (%) (Auto)  0.3 % 


 


Basophils (%) (Auto)  0.4 % 


 


Neutrophils # (Auto)  30.6 TH/MM3 


 


Lymphocytes # (Auto)  8.0 TH/MM3 


 


Monocytes # (Auto)  2.0 TH/MM3 


 


Eosinophils # (Auto)  0.1 TH/MM3 


 


Basophils # (Auto)  0.2 TH/MM3 


 


CBC Comment  AUTO DIFF 


 


Differential Total Cells


Counted 


  100 


 


 


Neutrophils % (Manual)  68 % 


 


Band Neutrophils %  8 % 


 


Lymphocytes %  12 % 


 


Monocytes %  9 % 


 


Eosinophils %  2 % 


 


Basophils %  1 % 


 


Neutrophils # (Manual)  31.2 TH/MM3 


 


Differential Comment


  


  FINAL DIFF


MANUAL


 


Platelet Estimate  HIGH 


 


Platelet Morphology Comment  NORMAL 


 


Prothrombin Time  11.4 SEC 


 


Prothromb Time International


Ratio 


  1.1 RATIO 


 


 


Urine Color  LIGHT-YELLOW 


 


Urine Turbidity  CLEAR 


 


Urine pH  5.0 


 


Urine Specific Gravity  1.022 


 


Urine Protein  30 mg/dL 


 


Urine Glucose (UA)  1000 mg/dL 


 


Urine Ketones  150 mg/dL 


 


Urine Occult Blood  SMALL 


 


Urine Nitrite  NEG 


 


Urine Bilirubin  NEG 


 


Urine Urobilinogen


  


  LESS THAN 2.0


MG/DL


 


Urine Leukocyte Esterase  NEG 


 


Urine RBC  1 /hpf 


 


Urine WBC  1 /hpf 


 


Urine Bacteria  RARE /hpf 


 


Urine Mucus  FEW /lpf 


 


Microscopic Urinalysis Comment


  


  CATH-CULTURE


IND


 


Urine Random Creatinine  18.7 MG/DL 


 


Urine Random Sodium  45 MEQ/L 


 


Blood Urea Nitrogen  44 MG/DL 


 


Creatinine  2.46 MG/DL 


 


Random Glucose  1165 MG/DL 


 


Total Protein  6.9 GM/DL 


 


Albumin  3.9 GM/DL 


 


Calcium Level  8.0 MG/DL 


 


Alkaline Phosphatase  164 U/L 


 


Aspartate Amino Transf


(AST/SGOT) 


  39 U/L 


 


 


Alanine Aminotransferase


(ALT/SGPT) 


  56 U/L 


 


 


Total Bilirubin  0.4 MG/DL 


 


Sodium Level  127 MEQ/L 


 


Potassium Level  6.3 MEQ/L 


 


Chloride Level  89 MEQ/L 


 


Carbon Dioxide Level


  


  LESS THAN 5.0


MEQ/L


 


Anion Gap  33 MEQ/L 


 


Estimat Glomerular Filtration


Rate 


  27 ML/MIN 


 


 


Lactic Acid Level  4.5 mmol/L 


 


Ammonia  161 MCMOL/L 


 


Total Creatine Kinase  273 U/L 


 


Troponin I  0.04 NG/ML 


 


Thyroid Stimulating Hormone


3rd Gen 


  1.090 uIU/ML 


 


 


Salicylates Level  5.9 MG/DL 


 


Urine Opiates Screen  NEG 


 


Acetaminophen Level


  


  LESS THAN 2.0


MCG/ML


 


Urine Barbiturates Screen  NEG 


 


Urine Amphetamines Screen  NEG 


 


Urine Benzodiazepines Screen  NEG 


 


Urine Cocaine Screen  NEG 


 


Urine Cannabinoids Screen  NEG 


 


Ethyl Alcohol Level


  


  LESS THAN 3


MG/DL


 


B-Hydroxybutyrate  14.74 MMOL/L 











MDM


Medical Decision Making


Medical Screen Exam Complete:  Yes


Emergency Medical Condition:  Yes


Medical Record Reviewed:  Yes


Interpretation(s)


Twelve-lead EKG was reviewed by me.  Normal sinus rhythm, normal axis, 

intraventricular conduction delay, motion artifact.  Heart rate of 86 bpm.


Differential Diagnosis


DKA with metabolic encephalopathy, intracranial bleed, metabolic encephalopathy


Narrative Course


12:34 PM patient was intubated as per my decision.  Please refer to my 

procedure note.  Patient tolerated the intubation well.  There was a VBG 

ordered immediately which showed significant metabolic acidosis with bicarb of 

3.  Besides giving patient IV fluid bolus and 10 units of regular insulin bolus 

I have ordered insulin drip and bicarb drip as per the DKA protocol.  Patient 

will need to go to the ICU.  Awaiting for the intensivist to call back.





12:58 PM CBC shows significant leukocytosis.  Based on that I have added Zosyn 

and vancomycin for possible sepsis.  I just discussed over the phone his 

condition with patient's sister Philly.  I have informed her patient's current 

condition being critical and diabetic coma.  I discussed the case with the 

intensivist was accepted the patient.  Patient is over at CT scan a right now.  

As per the sister patient drinks a lot of alcohol.  Chest x-ray has been 

reported as ET tube to be in good position.





1:25 PM awaiting for the bicarb drip to be started.  Meanwhile ABG shows 

significant metabolic acidosis still with pH of 6.7.  I have ordered 2 Amps of 

bicarb bolus at this point.


Critical Care Narrative


Aggregate critical care time was 60 minutes. Time to perform other separately 

billable procedures was not  


included in the critical care time. My time did not include minutes spent 

treating any other patients simultaneously or on  


activities that did not directly contribute to the patient's treatment.  





The services I provided to this patient were to treat and/or prevent clinically 

significant deterioration that could result  


in: Unresponsive, DKA, diabetic coma, sepsis, sepsis protocol, ventilator 

management





I provided critical care services requiring my management, as noted below:


Chart data review, documentation time, medication orders and management, vital 

sign assessments/reviewing monitor data,  


ordering and reviewing lab tests, ordering and interpreting/reviewing x-rays 

and diagnostic studies, care of the patient  


and discussion of the patient with the admitting physicians.





Procedures


**Procedure Narrative**


After the risks and benefits were discussed the following procedure was 

performed:


INTUBATION: The patient was put in optimal position for the procedure.  Rapid 

sequence intubation was initiated by me using  


20 milligrams of etomidate IV and 100 milligrams of succinylcholine IV.  The 

patient was intubated with a 7.5 cuffed endotracheal  


tube.  Tube placement was confirmed by visualization of the tube and balloon 

passing through the cords, capnometry and  


subsequent chest x-ray.  Breath sounds were equal and well aerated bilaterally 

postintubation.  No breath sounds over  


stomach.  Patient tolerated procedure well.


EKG Prior to Arrival:  No





Physician Communication


Physician Communication


Dr. Moya





Diagnosis





 Primary Impression:  


 Diabetic coma


 Additional Impressions:  


 Sepsis


 Qualified Codes:  A41.9 - Sepsis, unspecified organism


 Respiratory failure


 Qualified Codes:  J96.00 - Acute respiratory failure, unspecified whether with 

hypoxia or hypercapnia


 Metabolic acidosis


 DKA (diabetic ketoacidoses)


 Qualified Codes:  E10.11 - Type 1 diabetes mellitus with ketoacidosis with coma





Admitting Information


Admitting Physician Requests:  it











Alayna Torres MD Mar 17, 2018 12:37

## 2018-03-17 NOTE — RADRPT
EXAM DATE/TIME:  03/17/2018 12:58 

 

[facility] COMPARISON:     

No previous studies available for comparison.

 

 

INDICATIONS :     

Found unresponsive.

                      

 

RADIATION DOSE:     

56.35 CTDIvol (mGy) 

 

 

 

MEDICAL HISTORY :     

Diabetes mellitus type 2.  

 

SURGICAL HISTORY :      

None. 

 

ENCOUNTER:      

Initial

 

ACUITY:      

1 day

 

PAIN SCALE:      

Non-responsive

 

LOCATION:        

cranial 

 

TECHNIQUE:     

Multiple contiguous axial images were obtained of the head.  Using automated exposure control and adj
ustment of the mA and/or kV according to patient size, radiation dose was kept as low as reasonably a
chievable to obtain optimal diagnostic quality images.   DICOM format image data is available electro
nically for review and comparison.  

 

FINDINGS:     

 

CEREBRUM:     

The ventricles are normal for age.  No evidence of midline shift, mass lesion, hemorrhage or acute in
farction.  No extra-axial fluid collections are seen.

 

POSTERIOR FOSSA:     

The cerebellum and brainstem are intact.  The 4th ventricle is midline.  The cerebellopontine angle i
s unremarkable.

 

EXTRACRANIAL:     

The visualized portion of the orbits is intact.

 

SKULL:     

The calvaria is intact.  No evidence of skull fracture.

 

CONCLUSION:     

1. No acute intracranial abnormality.

 

 

 

 Benjamín Guerra MD on March 17, 2018 at 13:07           

Board Certified Radiologist.

 This report was verified electronically.

## 2018-03-17 NOTE — RADRPT
EXAM DATE/TIME:  03/17/2018 17:05 

 

[facility] COMPARISON:     

CHEST SINGLE AP, March 17, 2018, 12:30.

 

                     

INDICATIONS :     

Central line placement.

                     

 

MEDICAL HISTORY :            

Diabetes. Tobacco use.   

 

SURGICAL HISTORY :     

None.   

 

ENCOUNTER:     

Subsequent                                        

 

ACUITY:     

1 day      

 

PAIN SCORE:     

Non-responsive.

 

LOCATION:     

Bilateral chest 

 

FINDINGS:     

The heart is normal in size. The mediastinal contours are within normal limits. The lungs are clear. 
Air graft the endotracheal tube and left jugular central line appear in satisfactory position. Air gr
aft the osseous structures are grossly intact.

 

CONCLUSION:     

1. Left jugular central line in satisfactory position with no evidence of pneumothorax.

2. Endotracheal tube and nasogastric tube in satisfactory position.

 

 

 

 Benjamín Guerra MD on March 17, 2018 at 17:21           

Board Certified Radiologist.

 This report was verified electronically.

## 2018-03-17 NOTE — RADRPT
EXAM DATE/TIME:  03/17/2018 12:30 

 

[facility] COMPARISON:     

No previous studies available for comparison.

 

                     

INDICATIONS :     

Post intubation

                     

 

MEDICAL HISTORY :            

Non - responsive   

 

SURGICAL HISTORY :        

Non-responsive

 

ENCOUNTER:     

Initial                                        

 

ACUITY:     

1 day      

 

PAIN SCORE:     

Non-responsive.

 

LOCATION:      

chest 

 

FINDINGS:     

The endotracheal tube in satisfactory position. There is a nasogastric tube present.

 

No pneumothorax is seen. The lungs are clear. The visualized osseous structures are intact.

 

CONCLUSION:     

1. ET tube and NG tube in good position.

 

 

 

 Benjamín Guerra MD on March 17, 2018 at 12:43           

Board Certified Radiologist.

 This report was verified electronically.

## 2018-03-18 VITALS — HEART RATE: 68 BPM

## 2018-03-18 VITALS — HEART RATE: 81 BPM

## 2018-03-18 VITALS — DIASTOLIC BLOOD PRESSURE: 56 MMHG | SYSTOLIC BLOOD PRESSURE: 102 MMHG | HEART RATE: 73 BPM

## 2018-03-18 VITALS
RESPIRATION RATE: 16 BRPM | OXYGEN SATURATION: 100 % | HEART RATE: 68 BPM | SYSTOLIC BLOOD PRESSURE: 103 MMHG | TEMPERATURE: 97.8 F | DIASTOLIC BLOOD PRESSURE: 57 MMHG

## 2018-03-18 VITALS
HEART RATE: 75 BPM | DIASTOLIC BLOOD PRESSURE: 65 MMHG | RESPIRATION RATE: 16 BRPM | OXYGEN SATURATION: 100 % | TEMPERATURE: 97.7 F | SYSTOLIC BLOOD PRESSURE: 135 MMHG

## 2018-03-18 VITALS — DIASTOLIC BLOOD PRESSURE: 65 MMHG | HEART RATE: 73 BPM | SYSTOLIC BLOOD PRESSURE: 135 MMHG

## 2018-03-18 VITALS — SYSTOLIC BLOOD PRESSURE: 108 MMHG | DIASTOLIC BLOOD PRESSURE: 57 MMHG | HEART RATE: 73 BPM

## 2018-03-18 VITALS — HEART RATE: 74 BPM

## 2018-03-18 VITALS
RESPIRATION RATE: 16 BRPM | SYSTOLIC BLOOD PRESSURE: 113 MMHG | OXYGEN SATURATION: 100 % | DIASTOLIC BLOOD PRESSURE: 65 MMHG | TEMPERATURE: 98.6 F | HEART RATE: 69 BPM

## 2018-03-18 VITALS — OXYGEN SATURATION: 100 %

## 2018-03-18 VITALS
OXYGEN SATURATION: 100 % | HEART RATE: 79 BPM | DIASTOLIC BLOOD PRESSURE: 66 MMHG | RESPIRATION RATE: 16 BRPM | TEMPERATURE: 99 F | SYSTOLIC BLOOD PRESSURE: 110 MMHG

## 2018-03-18 VITALS — HEART RATE: 80 BPM

## 2018-03-18 VITALS — HEART RATE: 77 BPM

## 2018-03-18 LAB
BASOPHILS # BLD AUTO: 0 TH/MM3 (ref 0–0.2)
BASOPHILS NFR BLD: 0.2 % (ref 0–2)
BUN SERPL-MCNC: 38 MG/DL (ref 7–18)
CALCIUM SERPL-MCNC: 7.2 MG/DL (ref 8.5–10.1)
CALCIUM SERPL-MCNC: 7.6 MG/DL (ref 8.5–10.1)
CALCIUM SERPL-MCNC: 8 MG/DL (ref 8.5–10.1)
CALCIUM TP COR SERPL-MCNC: 8.4 MG/DL (ref 8.5–10.1)
CHLORIDE SERPL-SCNC: 113 MEQ/L (ref 98–107)
CHLORIDE SERPL-SCNC: 115 MEQ/L (ref 98–107)
CHLORIDE SERPL-SCNC: 117 MEQ/L (ref 98–107)
CHOLEST SERPL-MCNC: 133 MG/DL (ref 120–200)
CHOLESTEROL/ HDL RATIO: 5.11 RATIO
CREAT SERPL-MCNC: 2.09 MG/DL (ref 0.6–1.3)
CREAT SERPL-MCNC: 2.18 MG/DL (ref 0.6–1.3)
CREAT SERPL-MCNC: 2.24 MG/DL (ref 0.6–1.3)
EOSINOPHIL # BLD: 0 TH/MM3 (ref 0–0.4)
EOSINOPHIL NFR BLD: 0 % (ref 0–4)
ERYTHROCYTE [DISTWIDTH] IN BLOOD BY AUTOMATED COUNT: 12.6 % (ref 11.6–17.2)
ERYTHROCYTE [DISTWIDTH] IN BLOOD BY AUTOMATED COUNT: 12.9 % (ref 11.6–17.2)
GFR SERPLBLD BASED ON 1.73 SQ M-ARVRAT: 30 ML/MIN (ref 89–?)
GFR SERPLBLD BASED ON 1.73 SQ M-ARVRAT: 31 ML/MIN (ref 89–?)
GFR SERPLBLD BASED ON 1.73 SQ M-ARVRAT: 32 ML/MIN (ref 89–?)
GLUCOSE SERPL-MCNC: 156 MG/DL (ref 74–106)
GLUCOSE SERPL-MCNC: 192 MG/DL (ref 74–106)
GLUCOSE SERPL-MCNC: 276 MG/DL (ref 74–106)
HBA1C MFR BLD: 10 % (ref 4.3–6)
HCO3 BLD-SCNC: 17 MEQ/L (ref 21–32)
HCO3 BLD-SCNC: 20.7 MEQ/L (ref 21–32)
HCO3 BLD-SCNC: 23.3 MEQ/L (ref 21–32)
HCT VFR BLD CALC: 32.1 % (ref 39–51)
HCT VFR BLD CALC: 35.5 % (ref 39–51)
HDLC SERPL-MCNC: 26 MG/DL (ref 40–60)
HGB BLD-MCNC: 11.3 GM/DL (ref 13–17)
HGB BLD-MCNC: 12.8 GM/DL (ref 13–17)
INR PPP: 1.1 RATIO
LDLC SERPL-MCNC: 41 MG/DL (ref 0–99)
LYMPHOCYTES # BLD AUTO: 0.8 TH/MM3 (ref 1–4.8)
LYMPHOCYTES NFR BLD AUTO: 4 % (ref 9–44)
MAGNESIUM SERPL-MCNC: 1.8 MG/DL (ref 1.5–2.5)
MAGNESIUM SERPL-MCNC: 2 MG/DL (ref 1.5–2.5)
MAGNESIUM SERPL-MCNC: 2 MG/DL (ref 1.5–2.5)
MCH RBC QN AUTO: 32.7 PG (ref 27–34)
MCH RBC QN AUTO: 33 PG (ref 27–34)
MCHC RBC AUTO-ENTMCNC: 35.3 % (ref 32–36)
MCHC RBC AUTO-ENTMCNC: 35.9 % (ref 32–36)
MCV RBC AUTO: 91.9 FL (ref 80–100)
MCV RBC AUTO: 92.6 FL (ref 80–100)
MONOCYTE #: 2.1 TH/MM3 (ref 0–0.9)
MONOCYTES NFR BLD: 11 % (ref 0–8)
NEUTROPHILS # BLD AUTO: 16.1 TH/MM3 (ref 1.8–7.7)
NEUTROPHILS NFR BLD AUTO: 84.8 % (ref 16–70)
PHOSPHATE SERPL-MCNC: 0.4 MG/DL (ref 2.5–4.9)
PHOSPHATE SERPL-MCNC: 0.5 MG/DL (ref 2.5–4.9)
PHOSPHATE SERPL-MCNC: 3.5 MG/DL (ref 2.5–4.9)
PLATELET # BLD: 190 TH/MM3 (ref 150–450)
PLATELET # BLD: 291 TH/MM3 (ref 150–450)
PMV BLD AUTO: 6.9 FL (ref 7–11)
PMV BLD AUTO: 7.3 FL (ref 7–11)
PROT SERPL-MCNC: 4.9 GM/DL (ref 6.4–8.2)
PROTHROMBIN TIME: 11.4 SEC (ref 9.8–11.6)
RBC # BLD AUTO: 3.47 MIL/MM3 (ref 4.5–5.9)
RBC # BLD AUTO: 3.87 MIL/MM3 (ref 4.5–5.9)
SODIUM SERPL-SCNC: 147 MEQ/L (ref 136–145)
SODIUM SERPL-SCNC: 148 MEQ/L (ref 136–145)
SODIUM SERPL-SCNC: 149 MEQ/L (ref 136–145)
SODIUM,RANDOM URINE: 45 MEQ/L
TRIGL SERPL-MCNC: 329 MG/DL (ref 42–150)
TROPONIN I SERPL-MCNC: 0.61 NG/ML (ref 0.02–0.05)
WBC # BLD AUTO: 13.7 TH/MM3 (ref 4–11)
WBC # BLD AUTO: 19 TH/MM3 (ref 4–11)

## 2018-03-18 RX ADMIN — IPRATROPIUM BROMIDE AND ALBUTEROL SULFATE SCH AMPULE: .5; 3 SOLUTION RESPIRATORY (INHALATION) at 14:30

## 2018-03-18 RX ADMIN — POLYVINYL ALCOHOL SCH DROP: 14 SOLUTION/ DROPS OPHTHALMIC at 13:00

## 2018-03-18 RX ADMIN — TAZOBACTAM SODIUM AND PIPERACILLIN SODIUM SCH MLS/HR: 250; 2 INJECTION, SOLUTION INTRAVENOUS at 19:54

## 2018-03-18 RX ADMIN — IPRATROPIUM BROMIDE AND ALBUTEROL SULFATE SCH AMPULE: .5; 3 SOLUTION RESPIRATORY (INHALATION) at 19:45

## 2018-03-18 RX ADMIN — POTASSIUM CHLORIDE PRN MLS/HR: 400 INJECTION, SOLUTION INTRAVENOUS at 03:58

## 2018-03-18 RX ADMIN — CHLORHEXIDINE GLUCONATE 0.12% ORAL RINSE SCH ML: 1.2 LIQUID ORAL at 10:24

## 2018-03-18 RX ADMIN — HEPARIN SODIUM (PORCINE) LOCK FLUSH IV SOLN 100 UNIT/ML SCH MLS/HR: 100 SOLUTION at 07:22

## 2018-03-18 RX ADMIN — POLYVINYL ALCOHOL SCH DROP: 14 SOLUTION/ DROPS OPHTHALMIC at 09:00

## 2018-03-18 RX ADMIN — WATER SCH ML: 1 IRRIGANT IRRIGATION at 15:38

## 2018-03-18 RX ADMIN — DEXTROSE MONOHYDRATE PRN MLS/HR: 10 INJECTION, SOLUTION INTRAVENOUS at 05:47

## 2018-03-18 RX ADMIN — FAMOTIDINE SCH MG: 10 INJECTION, SOLUTION INTRAVENOUS at 09:22

## 2018-03-18 RX ADMIN — MIDAZOLAM HYDROCHLORIDE PRN MLS/HR: 5 INJECTION, SOLUTION INTRAMUSCULAR; INTRAVENOUS at 06:13

## 2018-03-18 RX ADMIN — HUMAN INSULIN SCH: 100 INJECTION, SOLUTION SUBCUTANEOUS at 20:00

## 2018-03-18 RX ADMIN — POLYVINYL ALCOHOL SCH DROP: 14 SOLUTION/ DROPS OPHTHALMIC at 18:00

## 2018-03-18 RX ADMIN — TAZOBACTAM SODIUM AND PIPERACILLIN SODIUM SCH MLS/HR: 250; 2 INJECTION, SOLUTION INTRAVENOUS at 15:38

## 2018-03-18 RX ADMIN — DEXTROSE MONOHYDRATE PRN MLS/HR: 10 INJECTION, SOLUTION INTRAVENOUS at 09:33

## 2018-03-18 RX ADMIN — MIDAZOLAM HYDROCHLORIDE PRN MLS/HR: 5 INJECTION, SOLUTION INTRAMUSCULAR; INTRAVENOUS at 02:25

## 2018-03-18 RX ADMIN — CHLORHEXIDINE GLUCONATE 0.12% ORAL RINSE SCH ML: 1.2 LIQUID ORAL at 19:59

## 2018-03-18 RX ADMIN — Medication SCH ML: at 19:59

## 2018-03-18 RX ADMIN — FAMOTIDINE SCH MG: 10 INJECTION, SOLUTION INTRAVENOUS at 19:59

## 2018-03-18 RX ADMIN — ACYCLOVIR SCH UNITS: 800 TABLET ORAL at 19:55

## 2018-03-18 RX ADMIN — THIAMINE HYDROCHLORIDE SCH MLS/HR: 100 INJECTION, SOLUTION INTRAMUSCULAR; INTRAVENOUS at 05:46

## 2018-03-18 RX ADMIN — POTASSIUM CHLORIDE PRN MLS/HR: 400 INJECTION, SOLUTION INTRAVENOUS at 02:23

## 2018-03-18 RX ADMIN — THIAMINE HYDROCHLORIDE SCH MLS/HR: 100 INJECTION, SOLUTION INTRAMUSCULAR; INTRAVENOUS at 01:42

## 2018-03-18 RX ADMIN — SODIUM BICARBONATE SCH MLS/HR: 84 INJECTION, SOLUTION INTRAVENOUS at 03:26

## 2018-03-18 RX ADMIN — ACYCLOVIR SCH TAB: 800 TABLET ORAL at 09:22

## 2018-03-18 RX ADMIN — PHENYLEPHRINE HYDROCHLORIDE PRN MLS/HR: 10 INJECTION INTRAVENOUS at 11:11

## 2018-03-18 RX ADMIN — IPRATROPIUM BROMIDE AND ALBUTEROL SULFATE SCH AMPULE: .5; 3 SOLUTION RESPIRATORY (INHALATION) at 08:20

## 2018-03-18 RX ADMIN — FOLIC ACID SCH MG: 1 TABLET ORAL at 09:22

## 2018-03-18 RX ADMIN — HUMAN INSULIN SCH: 100 INJECTION, SOLUTION SUBCUTANEOUS at 15:41

## 2018-03-18 RX ADMIN — STANDARDIZED SENNA CONCENTRATE AND DOCUSATE SODIUM SCH TAB: 8.6; 5 TABLET, FILM COATED ORAL at 09:22

## 2018-03-18 RX ADMIN — WATER SCH ML: 1 IRRIGANT IRRIGATION at 18:00

## 2018-03-18 RX ADMIN — MIDAZOLAM HYDROCHLORIDE PRN MLS/HR: 5 INJECTION, SOLUTION INTRAMUSCULAR; INTRAVENOUS at 13:39

## 2018-03-18 RX ADMIN — TAZOBACTAM SODIUM AND PIPERACILLIN SODIUM SCH MLS/HR: 250; 2 INJECTION, SOLUTION INTRAVENOUS at 09:18

## 2018-03-18 RX ADMIN — Medication SCH ML: at 09:23

## 2018-03-18 RX ADMIN — ACYCLOVIR SCH UNITS: 800 TABLET ORAL at 15:39

## 2018-03-18 RX ADMIN — DEXTROSE MONOHYDRATE PRN MLS/HR: 10 INJECTION, SOLUTION INTRAVENOUS at 00:29

## 2018-03-18 RX ADMIN — TAZOBACTAM SODIUM AND PIPERACILLIN SODIUM SCH MLS/HR: 250; 2 INJECTION, SOLUTION INTRAVENOUS at 01:45

## 2018-03-18 RX ADMIN — FOLIC ACID SCH MLS/HR: 5 INJECTION, SOLUTION INTRAMUSCULAR; INTRAVENOUS; SUBCUTANEOUS at 10:24

## 2018-03-18 RX ADMIN — WATER SCH ML: 1 IRRIGANT IRRIGATION at 20:51

## 2018-03-18 RX ADMIN — WATER SCH ML: 1 IRRIGANT IRRIGATION at 09:22

## 2018-03-18 RX ADMIN — IPRATROPIUM BROMIDE AND ALBUTEROL SULFATE SCH AMPULE: .5; 3 SOLUTION RESPIRATORY (INHALATION) at 11:51

## 2018-03-18 RX ADMIN — IPRATROPIUM BROMIDE AND ALBUTEROL SULFATE SCH AMPULE: .5; 3 SOLUTION RESPIRATORY (INHALATION) at 23:28

## 2018-03-18 RX ADMIN — Medication SCH ML: at 09:22

## 2018-03-18 RX ADMIN — IPRATROPIUM BROMIDE AND ALBUTEROL SULFATE SCH AMPULE: .5; 3 SOLUTION RESPIRATORY (INHALATION) at 04:01

## 2018-03-18 RX ADMIN — STANDARDIZED SENNA CONCENTRATE AND DOCUSATE SODIUM SCH TAB: 8.6; 5 TABLET, FILM COATED ORAL at 20:51

## 2018-03-18 RX ADMIN — HEPARIN SODIUM (PORCINE) LOCK FLUSH IV SOLN 100 UNIT/ML SCH MLS/HR: 100 SOLUTION at 14:38

## 2018-03-18 NOTE — HHI.CCPN
Subjective


Remarks/Hospital Course


62-year-old  male.  Date of admission 3/17/2018.  Past medical history 

includes diabetes according to friend per ER physician who is currently 

unavailable.  This individual presented to the emergency room by EMS after he 

was found unresponsive this morning by his friend in the hotel room.   When EMS 

arrived and checked his blood sugar the meter read high.  They brought him 

unresponsive with GCS of 3.  Vital signs were otherwise stable.  Patient was in 

no condition to give any meaningful history.  The bedside blood sugar in the 

emergency room read high as well.  There is no family or friend currently with 

the patient.  He was last seen normal last night. 





Patient received 20 mg etomidate and 100 mg succinylcholine was extubated with 

a 7.5 ET tube.  CT brain revealed no acute intracranial findings.  His white 

blood cell count is 41,000.  He has a macrocytosis on his lab draws ammonia 

levels 161.  Received  vancomycin and piperacillin/tazobactam in the emergency 

department.  He was started on lactulose 30 cc 4 times daily.  EKG revealed 

normal sinus rhythm 83.  Incomplete right bundle branch block.  Troponin is 

currently pending.  Creatinine was 2.4.  Potassium is 6.3.  Sodium was 127.  

Troponin 0 0.04.  TSH 1.19.  Patient received 10 mg insulin R and started on a 

drip currently 8 units an hour.  3 L normal saline wide open.  Currently normal 

saline at 250 cc now.  We are asked to admit.





Subjective





3/18: Afebrile.  Currently resting in bed in no acute distress on sedation.  

Decreased urine output.  Replacing phosphorus this a.m.  No bowel movement.  

Will start on tube feedings with Glucerna 1.5





Objective





Vital Signs








  Date Time  Temp Pulse Resp B/P (MAP) Pulse Ox O2 Delivery O2 Flow Rate FiO2


 


3/18/18 06:40        30


 


3/18/18 06:00  80      


 


3/18/18 06:00    127/69    


 


3/17/18 23:31     100   


 


3/17/18 20:00 99.1  28     


 


3/17/18 14:12      Ventilator  














Intake and Output   


 


 3/18/18 3/18/18 3/19/18





 08:00 16:00 00:00


 


Intake Total 3467 ml  


 


Output Total 1200 ml  


 


Balance 2267 ml  








Result Diagram:  


3/18/18 0525                                                                   

             3/18/18 0525





Other Results





Microbiology








 Date/Time


Source Procedure


Growth Status


 


 


 3/17/18 12:25


Blood Peripheral Aerobic Blood Culture


Pending Received


 


 3/17/18 12:25


Blood Peripheral Anaerobic Blood Culture


Pending Received


 


 3/17/18 15:47


Nasal Washing Influenza Types A,B Antigen (JJ) - Final


NEGATIVE FOR FLU A AND B ANTIGEN.... Complete





 3/17/18 12:25


Urine Catheterized Urine Legionella Antigen


Pending Received


 


 3/17/18 12:25


Urine Catheterized Urine Streptococcus pneumoniae Antigen (M


Pending Received








Imaging





Last Impressions








Chest X-Ray 3/17/18 1648 Signed





Impressions: 





 Service Date/Time:  Saturday, March 17, 2018 17:05 - CONCLUSION:  1. Left 





 jugular central line in satisfactory position with no evidence of 

pneumothorax. 





 2. Endotracheal tube and nasogastric tube in satisfactory position.     Benjamín Guerra MD 


 


Head CT 3/17/18 1217 Signed





Impressions: 





 Service Date/Time:  Saturday, March 17, 2018 12:58 - CONCLUSION:  1. No acute 





 intracranial abnormality.     Benjamín Guerra MD 


 


Liver Ultrasound 3/17/18 0000 Signed





Impressions: 





 Service Date/Time:  Saturday, March 17, 2018 13:50 - CONCLUSION:  The liver is 





 normal in size and echogenicity. No evidence of mass. There are large but 





 benign-appearing left renal cyst present..     Kori Hernandez MD 








Objective Remarks


GENERAL: 62-year-old  male currently orotracheally intubated


SKIN: Warm and dry.  Face and upper extremity are sunburned


HEAD: Atraumatic. Normocephalic. 


EYES: Pupils equal and round about 4 mm bilaterally and reactive. No scleral 

icterus. No injection or drainage. 


ENT: No nasal bleeding or discharge.  Mucous membranes pink and moist.


NECK: Trachea midline. No JVD.  Left IJ is clean dry and intact


CARDIOVASCULAR: Regular rate and rhythm.  S1, S2.  No S4.  No murmur


RESPIRATORY: Tachypneic, using accessory muscles.  No wheezing appreciated.


GASTROINTESTINAL: Abdomen soft, non-tender, nondistended.  Hypoactive bowel 

sounds are appreciated


MUSCULOSKELETAL: Extremities without significant peripheral edema. No obvious 

deformities.  Right femoral arterial line is clean dry and intact


NEUROLOGICAL: Cranial nerves II through XII grossly intact.  Positive gag and 

corneal reflex.  Positive cough.  Withdraws to pain.  Moves all 4 extremities 

on sedation vacation but not following commands.


Urinary Catheter:  Yes


Assessment to:  Continue


Guerrero insert reason:  Prolonged Immobilization


Vascular Central Line Catheter:  Yes


Assessment to:  Continue


Date of Insertion:  Mar 17, 2018


Line:  Central Venous Catheter


Side:  Left


Location:  Internal, Jugular





A/P


Assessment and Plan


Neuro/Psych:





Acute toxic metabolic encephalopathy secondary hyperglycemia and elevated 

ammonia





Currently on midazolam drip at 10 mg an hour l/fentanyl drips at 100 mg an hour 

for sedation/analgesia while intubated


Goal of RASS of -2


Daily sedation vacation


CT brain 3/17 revealed no acute intracranial findings


Ammonia level is 161 on admission currently 36.  See gastroenterology section


Thiamine 100 mg IV daily, folate 1 mg daily multiple tablet daily with 

macrocytosis


 


CV: 





History of hypertension


Lactic acidosis


Hypotension -likely volume


Elevated troponin likely type II non-STEMI due to demand ischemia





Currently on phenylephrine drip at 80 mg/min to maintain mean arterial pressure 

greater than equal to 65


Currently on D5 one quarter normal saline 250 cc an hour


Currently not requiring vasopressors and/or antihypertensives


Lactate is currently 2.3


Repeat troponin.  EKG.  2D echocardiogram routine ordered





Resp: 





Acute hypoxemic respiratory failure





[facility] 16/500/1/5/30


Ventilator bundle


Albuterol/ipratropium aerosols every 4 hours with albuterol aerosols every 2 

hours needed for dyspnea


Spontaneous breathing trials when clinically indicated


Chest x-ray post intubation reveals no acute cardiopulmonary findings





GI: 





Hyperammonia


Elevated AST





Start Glucerna 1.5 goal 50 cc an hour


Famotidine 20 mg daily for GI prophylaxis


Docusate sodium/senna 1 tablet twice daily for bowel regimen


Lactulose 30 cc every 6 hours for elevated ammonia level.  Currently 36


Liver ultrasound revealed no acute finding.  Hepatitis panel negative.





:  





Guerrero catheter has been placed for accurate I's and O's in a critically ill 

patient





Endo:  





Diabetic ketoacidosis


Hyperglycemia





Received 10 units are insulin ED.  Currently on insulin drip at 12.5 units an 

hour.  Goal decrease blood sugar by  every hour


BMP, mag and phosphorus every 6 hours with acetone every 12 hours


Attempt to reconcile home medications


Continue D5 one quarter normal saline at 250 cc an hour.





Renal: 





Glycosuria


Proteinuria


Acute kidney injury





Patient currently on aggressive crystalloid resuscitation


Negative urine eosinophils and prerenal indices urine electrolytes





Heme:





Leukocytosis neutrophil predominant


Normocytic anemia





Monitor CBC daily.  Follow trends


Macrocytosis and thrombocytosis have resolved





ID:





Cystitis





Currently piperacillin/tazobactam day #2


Received 1 dose of vancomycin ED





Blood cultures 2, urine culture 3/17 pending





FEN:





Hypernatremia


Hypophosphatemia





Replace electrolytes as clinically indicated


30 mmol sodium phosphorus IV 1 now.  Recheck this afternoon


Currently on D5 one quarter normal saline at 250 cc an hour with free water 100 

cc every 4 hours





MSK:





PT evaluate and treat





Access


-Utilize peripheral IV.  Central line if indicated





Prophylaxis


-GI -famotidine


-DVT -SCD/heparin subcu





Critical Care:


The total critical care time was 35 minutes. Time to perform other separately 

billable procedures was not included in the critical care time.





Discussed with sister Uzma Napier [phone].  Patient is a known diabetic 

previously intubated with a similar episode when newly diagnosed.  Patient is 

noncompliant, consumes alcohol.  Patient also had a non-STEMI last admission to 

ICU.  We will wean sedation today and attempt to wean off insulin drip as well.











Avel Moya MD Mar 18, 2018 07:16

## 2018-03-18 NOTE — EKG
Date Performed: 03/17/2018       Time Performed: 12:15:40

 

PTAGE:      62 years

 

EKG:      ECTOPIC ATRIAL RHYTHM POSSIBLE LEFT ATRIAL ENLARGEMENT POSSIBLE RIGHT VENTRICULAR CONDUCTIO
N DELAY MODERATE ST DEPRESSION ABNORMAL ECG There is marked baseline wanderinga and this is a technic
ally poor tracing. Recommend repeat tracing of better quality. 

 

NO PREVIOUS TRACING            

 

DOCTOR:   Jose Germain  Interpretating Date/Time  03/18/2018 13:02:45

## 2018-03-18 NOTE — MB
cc:

Silvia Baltazar MD

****

 

 

DATE OF CONSULT:

 

HISTORY OF PRESENT ILLNESS:

A 62-year-old white male with a history of diabetes mellitus and 

hypertension who was found unresponsive by his friend in the hotel 

room.  He was diagnosed with DKA and metabolic encephalopathy.  He has

previous history of admission for DKA and non-ST elevation myocardial 

infarction in the past.  The patient was intubated for acute respiratory 

failure and placed on the ventilator.  His troponin is slightly elevated.

 

PAST MEDICAL HISTORY:

Positive for diabetes mellitus, hypertension.

 

ALLERGIES:

NONE KNOWN AT THIS TIME.

 

SOCIAL HISTORY:

Unknown.

 

FAMILY HISTORY:

Unknown.

 

REVIEW OF SYSTEMS:

Otherwise negative.

 

PHYSICAL EXAMINATION:

VITAL SIGNS:  Blood pressure 110/62, pulse 95 and regular.

HEENT:  Negative.  Two plus carotid upstrokes, brisk.  No bruits.  The

patient is intubated and sedated.

LUNGS:  Clear.

HEART:  Regular with no murmur, gallop or rub.

ABDOMEN:  Soft.

EXTREMITIES:  No edema, 1+ distal pulses.

NEUROLOGIC:  Grossly nonfocal.  The patient is currently sedated.

 

EKG was reviewed and showed a normal sinus rhythm with normal axis and

intervals.  No acute changes.

 

LABORATORY DATA:

Hemoglobin 12.8.  Potassium 4.0, creatinine 2.24, AST of 39, ALT of 

56, CK of 273.  Troponin 0.04, 0.09 and 1.68.

 

ASSESSMENT:

1.  Acute respiratory failure.

2.  Diabetic ketoacidosis.

3.  Diabetes mellitus.

4.  Hypertension.

5.  Renal insufficiency.

6.  Elevated troponin.

 

PLAN:

Mr. Lott will be monitored on telemetry.  We will obtain 

echocardiogram to evaluate his left ventricular function.  He has 

previous history of similar hospitalization with elevated troponin.  

Based on the discussion with his sister, the patient was previously 

intubated and has been noncompliant, drinking alcohol.  I will follow 

him for cardiology during his hospitalization.

 

 

__________________________________

MD ANTOINE Son/CAR

D: 03/18/2018, 02:27 PM

T: 03/18/2018, 03:29 PM

Visit #: [id number]

Job #: [id number]

STEPHANIE

## 2018-03-19 VITALS
OXYGEN SATURATION: 98 % | RESPIRATION RATE: 25 BRPM | TEMPERATURE: 99.5 F | DIASTOLIC BLOOD PRESSURE: 99 MMHG | HEART RATE: 116 BPM | SYSTOLIC BLOOD PRESSURE: 203 MMHG

## 2018-03-19 VITALS — HEART RATE: 86 BPM | SYSTOLIC BLOOD PRESSURE: 159 MMHG | DIASTOLIC BLOOD PRESSURE: 92 MMHG

## 2018-03-19 VITALS — OXYGEN SATURATION: 100 %

## 2018-03-19 VITALS
OXYGEN SATURATION: 100 % | TEMPERATURE: 100.2 F | HEART RATE: 91 BPM | RESPIRATION RATE: 18 BRPM | DIASTOLIC BLOOD PRESSURE: 79 MMHG | SYSTOLIC BLOOD PRESSURE: 142 MMHG

## 2018-03-19 VITALS
HEART RATE: 107 BPM | RESPIRATION RATE: 16 BRPM | TEMPERATURE: 98.8 F | OXYGEN SATURATION: 100 % | SYSTOLIC BLOOD PRESSURE: 208 MMHG | DIASTOLIC BLOOD PRESSURE: 97 MMHG

## 2018-03-19 VITALS — HEART RATE: 97 BPM

## 2018-03-19 VITALS — HEART RATE: 96 BPM

## 2018-03-19 VITALS
HEART RATE: 74 BPM | DIASTOLIC BLOOD PRESSURE: 62 MMHG | RESPIRATION RATE: 15 BRPM | OXYGEN SATURATION: 100 % | TEMPERATURE: 98.9 F | SYSTOLIC BLOOD PRESSURE: 106 MMHG

## 2018-03-19 VITALS
DIASTOLIC BLOOD PRESSURE: 51 MMHG | TEMPERATURE: 97.8 F | OXYGEN SATURATION: 100 % | RESPIRATION RATE: 16 BRPM | SYSTOLIC BLOOD PRESSURE: 97 MMHG | HEART RATE: 87 BPM

## 2018-03-19 VITALS
TEMPERATURE: 98 F | HEART RATE: 88 BPM | RESPIRATION RATE: 16 BRPM | DIASTOLIC BLOOD PRESSURE: 80 MMHG | SYSTOLIC BLOOD PRESSURE: 183 MMHG

## 2018-03-19 VITALS — OXYGEN SATURATION: 92 %

## 2018-03-19 VITALS — HEART RATE: 75 BPM

## 2018-03-19 VITALS — HEART RATE: 71 BPM

## 2018-03-19 VITALS — HEART RATE: 81 BPM

## 2018-03-19 VITALS — HEART RATE: 82 BPM | DIASTOLIC BLOOD PRESSURE: 63 MMHG | SYSTOLIC BLOOD PRESSURE: 126 MMHG

## 2018-03-19 VITALS — HEART RATE: 123 BPM | SYSTOLIC BLOOD PRESSURE: 240 MMHG | DIASTOLIC BLOOD PRESSURE: 114 MMHG

## 2018-03-19 VITALS — HEART RATE: 87 BPM

## 2018-03-19 LAB
ALBUMIN SERPL-MCNC: 2.4 GM/DL (ref 3.4–5)
ALP SERPL-CCNC: 96 U/L (ref 45–117)
ALT SERPL-CCNC: 30 U/L (ref 12–78)
AST SERPL-CCNC: 28 U/L (ref 15–37)
BASOPHILS # BLD AUTO: 0 TH/MM3 (ref 0–0.2)
BASOPHILS # BLD AUTO: 0 TH/MM3 (ref 0–0.2)
BASOPHILS NFR BLD: 0.2 % (ref 0–2)
BASOPHILS NFR BLD: 0.3 % (ref 0–2)
BILIRUB SERPL-MCNC: 0.3 MG/DL (ref 0.2–1)
BUN SERPL-MCNC: 29 MG/DL (ref 7–18)
CALCIUM SERPL-MCNC: 7.4 MG/DL (ref 8.5–10.1)
CALCIUM TP COR SERPL-MCNC: 8.7 MG/DL (ref 8.5–10.1)
CHLORIDE SERPL-SCNC: 118 MEQ/L (ref 98–107)
CHOLEST SERPL-MCNC: 145 MG/DL (ref 120–200)
CHOLESTEROL/ HDL RATIO: 6.33 RATIO
CREAT SERPL-MCNC: 2.16 MG/DL (ref 0.6–1.3)
EOSINOPHIL # BLD: 0 TH/MM3 (ref 0–0.4)
EOSINOPHIL # BLD: 0 TH/MM3 (ref 0–0.4)
EOSINOPHIL NFR BLD: 0 % (ref 0–4)
EOSINOPHIL NFR BLD: 0.1 % (ref 0–4)
ERYTHROCYTE [DISTWIDTH] IN BLOOD BY AUTOMATED COUNT: 13.1 % (ref 11.6–17.2)
ERYTHROCYTE [DISTWIDTH] IN BLOOD BY AUTOMATED COUNT: 13.2 % (ref 11.6–17.2)
FIBRINOGEN PPP-MCNC: 424 MG/DL (ref 227–377)
GFR SERPLBLD BASED ON 1.73 SQ M-ARVRAT: 31 ML/MIN (ref 89–?)
GLUCOSE SERPL-MCNC: 138 MG/DL (ref 74–106)
HBA1C MFR BLD: 10 % (ref 4.3–6)
HCO3 BLD-SCNC: 20.2 MEQ/L (ref 21–32)
HCT VFR BLD CALC: 30.9 % (ref 39–51)
HCT VFR BLD CALC: 31.2 % (ref 39–51)
HDLC SERPL-MCNC: 22.9 MG/DL (ref 40–60)
HGB BLD-MCNC: 11 GM/DL (ref 13–17)
HGB BLD-MCNC: 11.1 GM/DL (ref 13–17)
INR PPP: 1.1 RATIO
LYMPHOCYTES # BLD AUTO: 0.5 TH/MM3 (ref 1–4.8)
LYMPHOCYTES # BLD AUTO: 0.8 TH/MM3 (ref 1–4.8)
LYMPHOCYTES NFR BLD AUTO: 10 % (ref 9–44)
LYMPHOCYTES NFR BLD AUTO: 13 % (ref 9–44)
MAGNESIUM SERPL-MCNC: 1.9 MG/DL (ref 1.5–2.5)
MCH RBC QN AUTO: 33.2 PG (ref 27–34)
MCH RBC QN AUTO: 33.2 PG (ref 27–34)
MCHC RBC AUTO-ENTMCNC: 35.5 % (ref 32–36)
MCHC RBC AUTO-ENTMCNC: 35.6 % (ref 32–36)
MCV RBC AUTO: 93.2 FL (ref 80–100)
MCV RBC AUTO: 93.6 FL (ref 80–100)
MONOCYTE #: 0.4 TH/MM3 (ref 0–0.9)
MONOCYTE #: 0.4 TH/MM3 (ref 0–0.9)
MONOCYTES NFR BLD: 6.6 % (ref 0–8)
MONOCYTES NFR BLD: 7 % (ref 0–8)
NEUTROPHILS # BLD AUTO: 4.6 TH/MM3 (ref 1.8–7.7)
NEUTROPHILS # BLD AUTO: 4.9 TH/MM3 (ref 1.8–7.7)
NEUTROPHILS NFR BLD AUTO: 79.6 % (ref 16–70)
NEUTROPHILS NFR BLD AUTO: 83.2 % (ref 16–70)
PHOSPHATE SERPL-MCNC: 1.8 MG/DL (ref 2.5–4.9)
PHOSPHATE SERPL-MCNC: 2.5 MG/DL (ref 2.5–4.9)
PLATELET # BLD: 81 TH/MM3 (ref 150–450)
PLATELET # BLD: 86 TH/MM3 (ref 150–450)
PMV BLD AUTO: 7 FL (ref 7–11)
PMV BLD AUTO: 7.7 FL (ref 7–11)
PROT SERPL-MCNC: 4.8 GM/DL (ref 6.4–8.2)
PROTHROMBIN TIME: 11.4 SEC (ref 9.8–11.6)
RBC # BLD AUTO: 3.32 MIL/MM3 (ref 4.5–5.9)
RBC # BLD AUTO: 3.33 MIL/MM3 (ref 4.5–5.9)
SODIUM SERPL-SCNC: 149 MEQ/L (ref 136–145)
T4 FREE SERPL-MCNC: 0.88 NG/DL (ref 0.76–1.46)
TRIGL SERPL-MCNC: 408 MG/DL (ref 42–150)
TROPONIN I SERPL-MCNC: 0.27 NG/ML (ref 0.02–0.05)
WBC # BLD AUTO: 5.5 TH/MM3 (ref 4–11)
WBC # BLD AUTO: 6.2 TH/MM3 (ref 4–11)

## 2018-03-19 RX ADMIN — HUMAN INSULIN SCH: 100 INJECTION, SOLUTION SUBCUTANEOUS at 12:00

## 2018-03-19 RX ADMIN — ASPIRIN 81 MG SCH MG: 81 TABLET ORAL at 09:00

## 2018-03-19 RX ADMIN — Medication SCH ML: at 09:00

## 2018-03-19 RX ADMIN — IPRATROPIUM BROMIDE AND ALBUTEROL SULFATE SCH AMPULE: .5; 3 SOLUTION RESPIRATORY (INHALATION) at 04:27

## 2018-03-19 RX ADMIN — HUMAN INSULIN SCH: 100 INJECTION, SOLUTION SUBCUTANEOUS at 08:00

## 2018-03-19 RX ADMIN — HUMAN INSULIN SCH: 100 INJECTION, SOLUTION SUBCUTANEOUS at 23:58

## 2018-03-19 RX ADMIN — IPRATROPIUM BROMIDE AND ALBUTEROL SULFATE SCH AMPULE: .5; 3 SOLUTION RESPIRATORY (INHALATION) at 16:22

## 2018-03-19 RX ADMIN — IPRATROPIUM BROMIDE AND ALBUTEROL SULFATE SCH AMPULE: .5; 3 SOLUTION RESPIRATORY (INHALATION) at 20:18

## 2018-03-19 RX ADMIN — HUMAN INSULIN SCH: 100 INJECTION, SOLUTION SUBCUTANEOUS at 16:00

## 2018-03-19 RX ADMIN — WATER SCH ML: 1 IRRIGANT IRRIGATION at 12:19

## 2018-03-19 RX ADMIN — HUMAN INSULIN SCH: 100 INJECTION, SOLUTION SUBCUTANEOUS at 09:30

## 2018-03-19 RX ADMIN — STANDARDIZED SENNA CONCENTRATE AND DOCUSATE SODIUM SCH TAB: 8.6; 5 TABLET, FILM COATED ORAL at 09:00

## 2018-03-19 RX ADMIN — WATER SCH ML: 1 IRRIGANT IRRIGATION at 20:18

## 2018-03-19 RX ADMIN — WATER SCH ML: 1 IRRIGANT IRRIGATION at 09:00

## 2018-03-19 RX ADMIN — ACYCLOVIR SCH UNITS: 800 TABLET ORAL at 09:00

## 2018-03-19 RX ADMIN — THIAMINE HYDROCHLORIDE SCH MLS/HR: 100 INJECTION, SOLUTION INTRAMUSCULAR; INTRAVENOUS at 10:39

## 2018-03-19 RX ADMIN — METOPROLOL TARTRATE SCH MG: 25 TABLET, FILM COATED ORAL at 10:39

## 2018-03-19 RX ADMIN — HUMAN INSULIN SCH: 100 INJECTION, SOLUTION SUBCUTANEOUS at 01:42

## 2018-03-19 RX ADMIN — STANDARDIZED SENNA CONCENTRATE AND DOCUSATE SODIUM SCH TAB: 8.6; 5 TABLET, FILM COATED ORAL at 20:18

## 2018-03-19 RX ADMIN — DEXMEDETOMIDINE HYDROCHLORIDE PRN MLS/HR: 100 INJECTION, SOLUTION, CONCENTRATE INTRAVENOUS at 17:21

## 2018-03-19 RX ADMIN — HUMAN INSULIN SCH: 100 INJECTION, SOLUTION SUBCUTANEOUS at 04:00

## 2018-03-19 RX ADMIN — IPRATROPIUM BROMIDE AND ALBUTEROL SULFATE SCH AMPULE: .5; 3 SOLUTION RESPIRATORY (INHALATION) at 07:51

## 2018-03-19 RX ADMIN — METOPROLOL TARTRATE SCH MG: 25 TABLET, FILM COATED ORAL at 21:00

## 2018-03-19 RX ADMIN — CHLORHEXIDINE GLUCONATE 0.12% ORAL RINSE SCH ML: 1.2 LIQUID ORAL at 20:23

## 2018-03-19 RX ADMIN — FAMOTIDINE SCH MG: 10 INJECTION, SOLUTION INTRAVENOUS at 09:09

## 2018-03-19 RX ADMIN — Medication SCH ML: at 20:17

## 2018-03-19 RX ADMIN — DEXMEDETOMIDINE HYDROCHLORIDE PRN MLS/HR: 100 INJECTION, SOLUTION, CONCENTRATE INTRAVENOUS at 14:36

## 2018-03-19 RX ADMIN — TAZOBACTAM SODIUM AND PIPERACILLIN SODIUM SCH MLS/HR: 250; 2 INJECTION, SOLUTION INTRAVENOUS at 08:00

## 2018-03-19 RX ADMIN — TAZOBACTAM SODIUM AND PIPERACILLIN SODIUM SCH MLS/HR: 250; 2 INJECTION, SOLUTION INTRAVENOUS at 20:17

## 2018-03-19 RX ADMIN — POLYVINYL ALCOHOL SCH DROP: 14 SOLUTION/ DROPS OPHTHALMIC at 12:20

## 2018-03-19 RX ADMIN — HEPARIN SODIUM (PORCINE) LOCK FLUSH IV SOLN 100 UNIT/ML SCH MLS/HR: 100 SOLUTION at 05:32

## 2018-03-19 RX ADMIN — ACYCLOVIR SCH TAB: 800 TABLET ORAL at 09:09

## 2018-03-19 RX ADMIN — TAZOBACTAM SODIUM AND PIPERACILLIN SODIUM SCH MLS/HR: 250; 2 INJECTION, SOLUTION INTRAVENOUS at 16:46

## 2018-03-19 RX ADMIN — CHLORHEXIDINE GLUCONATE SCH PACK: 500 CLOTH TOPICAL at 01:42

## 2018-03-19 RX ADMIN — POLYVINYL ALCOHOL SCH DROP: 14 SOLUTION/ DROPS OPHTHALMIC at 17:19

## 2018-03-19 RX ADMIN — POLYVINYL ALCOHOL SCH DROP: 14 SOLUTION/ DROPS OPHTHALMIC at 09:00

## 2018-03-19 RX ADMIN — FOLIC ACID SCH MLS/HR: 5 INJECTION, SOLUTION INTRAMUSCULAR; INTRAVENOUS; SUBCUTANEOUS at 09:15

## 2018-03-19 RX ADMIN — CHLORHEXIDINE GLUCONATE 0.12% ORAL RINSE SCH ML: 1.2 LIQUID ORAL at 08:00

## 2018-03-19 RX ADMIN — IPRATROPIUM BROMIDE AND ALBUTEROL SULFATE SCH AMPULE: .5; 3 SOLUTION RESPIRATORY (INHALATION) at 12:39

## 2018-03-19 RX ADMIN — FOLIC ACID SCH MG: 1 TABLET ORAL at 09:09

## 2018-03-19 RX ADMIN — TAZOBACTAM SODIUM AND PIPERACILLIN SODIUM SCH MLS/HR: 250; 2 INJECTION, SOLUTION INTRAVENOUS at 01:42

## 2018-03-19 RX ADMIN — DEXMEDETOMIDINE HYDROCHLORIDE PRN MLS/HR: 100 INJECTION, SOLUTION, CONCENTRATE INTRAVENOUS at 20:16

## 2018-03-19 RX ADMIN — HUMAN INSULIN SCH: 100 INJECTION, SOLUTION SUBCUTANEOUS at 20:25

## 2018-03-19 RX ADMIN — DEXMEDETOMIDINE HYDROCHLORIDE PRN MLS/HR: 100 INJECTION, SOLUTION, CONCENTRATE INTRAVENOUS at 22:07

## 2018-03-19 RX ADMIN — FAMOTIDINE SCH MG: 10 INJECTION, SOLUTION INTRAVENOUS at 20:18

## 2018-03-19 RX ADMIN — ACYCLOVIR SCH UNITS: 800 TABLET ORAL at 21:09

## 2018-03-19 RX ADMIN — WATER SCH ML: 1 IRRIGANT IRRIGATION at 17:35

## 2018-03-19 RX ADMIN — IPRATROPIUM BROMIDE AND ALBUTEROL SULFATE SCH AMPULE: .5; 3 SOLUTION RESPIRATORY (INHALATION) at 23:46

## 2018-03-19 NOTE — HHI.CCPN
Subjective


Remarks/Hospital Course


62-year-old  male.  Date of admission 3/17/2018.  Past medical history 

includes diabetes according to friend per ER physician who is currently 

unavailable.  This individual presented to the emergency room by EMS after he 

was found unresponsive this morning by his friend in the hotel room.   When EMS 

arrived and checked his blood sugar the meter read high.  They brought him 

unresponsive with GCS of 3.  Vital signs were otherwise stable.  Patient was in 

no condition to give any meaningful history.  The bedside blood sugar in the 

emergency room read high as well.  There is no family or friend currently with 

the patient.  He was last seen normal last night. 





Patient received 20 mg etomidate and 100 mg succinylcholine was extubated with 

a 7.5 ET tube.  CT brain revealed no acute intracranial findings.  His white 

blood cell count is 41,000.  He has a macrocytosis on his lab draws ammonia 

levels 161.  Received  vancomycin and piperacillin/tazobactam in the emergency 

department.  He was started on lactulose 30 cc 4 times daily.  EKG revealed 

normal sinus rhythm 83.  Incomplete right bundle branch block.  Troponin is 

currently pending.  Creatinine was 2.4.  Potassium is 6.3.  Sodium was 127.  

Troponin 0 0.04.  TSH 1.19.  Patient received 10 mg insulin R and started on a 

drip currently 8 units an hour.  3 L normal saline wide open.  Currently normal 

saline at 250 cc now.  We are asked to admit.





Subjective





3/18: Afebrile.  Currently resting in bed in no acute distress on sedation.  

Decreased urine output.  Replacing phosphorus this a.m.  No bowel movement.  

Will start on tube feedings with Glucerna 1.5





3/19 Patient remains intubated on no sedation. Off Neosyn and insulin drips. 

Afebrile.





Objective





Vital Signs








  Date Time  Temp Pulse Resp B/P (MAP) Pulse Ox O2 Delivery O2 Flow Rate FiO2


 


3/19/18 07:51     100   25


 


3/19/18 06:00  81      


 


3/19/18 04:00 97.8  16 97/51 (66)    


 


3/17/18 14:12      Ventilator  














Intake and Output   


 


 3/19/18 3/19/18 3/20/18





 08:00 16:00 00:00


 


Output Total 950 ml  


 


Balance -950 ml  








Result Diagram:  


3/18/18 1345                                                                   

             3/19/18 0616





Other Results





Laboratory Tests








Test


  3/18/18


13:45 3/19/18


01:43 3/19/18


06:16


 


White Blood Count 13.7 TH/MM3   


 


Red Blood Count 3.47 MIL/MM3   


 


Hemoglobin 11.3 GM/DL   


 


Hematocrit 32.1 %   


 


Mean Corpuscular Volume 92.6 FL   


 


Mean Corpuscular Hemoglobin 32.7 PG   


 


Mean Corpuscular Hemoglobin


Concent 35.3 % 


  


  


 


 


Red Cell Distribution Width 12.9 %   


 


Platelet Count 190 TH/MM3   


 


Mean Platelet Volume 7.3 FL   


 


Prothrombin Time 11.4 SEC   11.4 SEC 


 


Prothromb Time International


Ratio 1.1 RATIO 


  


  1.1 RATIO 


 


 


Activated Partial


Thromboplast Time 24.8 SEC 


  35.2 SEC 


  


 


 


Blood Urea Nitrogen 38 MG/DL   29 MG/DL 


 


Creatinine 2.18 MG/DL   2.16 MG/DL 


 


Random Glucose 192 MG/DL   138 MG/DL 


 


Total Protein 4.9 GM/DL   4.8 GM/DL 


 


Calcium Level 7.2 MG/DL   7.4 MG/DL 


 


Phosphorus Level 3.5 MG/DL   1.8 MG/DL 


 


Magnesium Level 1.8 MG/DL   1.9 MG/DL 


 


Sodium Level 148 MEQ/L   149 MEQ/L 


 


Potassium Level 3.3 MEQ/L   3.3 MEQ/L 


 


Chloride Level 115 MEQ/L   118 MEQ/L 


 


Carbon Dioxide Level 23.3 MEQ/L   20.2 MEQ/L 


 


Anion Gap 10 MEQ/L   11 MEQ/L 


 


Estimat Glomerular Filtration


Rate 31 ML/MIN 


  


  31 ML/MIN 


 


 


Protein Corrected Calcium 8.4 MG/DL   8.7 MG/DL 


 


Total Creatine Kinase 316 U/L   338 U/L 


 


Creatine Kinase MB 8.1 NG/ML   4.4 NG/ML 


 


Creatine Kinase MB % 2.6 %   1.3 % 


 


Troponin I 0.61 NG/ML   0.27 NG/ML 


 


Triglycerides Level 329 MG/DL   408 MG/DL 


 


Cholesterol Level 133 MG/DL   145 MG/DL 


 


LDL Cholesterol 41 MG/DL    MG/DL 


 


HDL Cholesterol 26.0 MG/DL   22.9 MG/DL 


 


Cholesterol/HDL Ratio 5.11 RATIO   6.33 RATIO 


 


Amylase Level 650 U/L   547 U/L 


 


Lipase 2012 U/L   1651 U/L 


 


B-Hydroxybutyrate 0.37 MMOL/L   


 


Fibrinogen   424 mg/dL 


 


Albumin   2.4 GM/DL 


 


Alkaline Phosphatase   96 U/L 


 


Aspartate Amino Transf


(AST/SGOT) 


  


  28 U/L 


 


 


Alanine Aminotransferase


(ALT/SGPT) 


  


  30 U/L 


 


 


Total Bilirubin   0.3 MG/DL 


 


Lactic Acid Level   1.9 mmol/L 


 


Ammonia   51 MCMOL/L 


 


Free Thyroxine   0.88 NG/DL 


 


Random Cortisol   14.6 MCG/DL 








Imaging





Last Impressions








Chest X-Ray 3/19/18 0600 Signed





Impressions: 





 Service Date/Time:  Monday, March 19, 2018 05:23 - CONCLUSION: The lungs are 





 clear.     Luca Mcgrath MD 


 


Head CT 3/17/18 1217 Signed





Impressions: 





 Service Date/Time:  Saturday, March 17, 2018 12:58 - CONCLUSION:  1. No acute 





 intracranial abnormality.     Benjamín Guerra MD 


 


Liver Ultrasound 3/17/18 0000 Signed





Impressions: 





 Service Date/Time:  Saturday, March 17, 2018 13:50 - CONCLUSION:  The liver is 





 normal in size and echogenicity. No evidence of mass. There are large but 





 benign-appearing left renal cyst present..     Kori Hernandez MD 








Objective Remarks


GENERAL: 62-year-old  male currently orotracheally intubated


SKIN: Warm and dry.  Face and upper extremity are sunburned


HEAD: Atraumatic. Normocephalic. 


EYES: Pupils equal and round about 4 mm bilaterally and reactive. No scleral 

icterus. No injection or drainage. 


ENT: No nasal bleeding or discharge.  Mucous membranes pink and moist.


NECK: Trachea midline. No JVD.  Left IJ is clean dry and intact


CARDIOVASCULAR: Regular rate and rhythm.  S1, S2.  No S4.  No murmur


RESPIRATORY: Tachypneic, using accessory muscles.  No wheezing appreciated.


GASTROINTESTINAL: Abdomen soft, non-tender, nondistended.  Hypoactive bowel 

sounds are appreciated


MUSCULOSKELETAL: Extremities without significant peripheral edema. No obvious 

deformities.  Right femoral arterial line is clean dry and intact


NEUROLOGICAL: Cranial nerves II through XII grossly intact.  Positive gag and 

corneal reflex.  Positive cough.  Withdraws to pain.  Moves all 4 extremities 

on sedation vacation but not following commands.


Date of Insertion:  Mar 17, 2018


Line:  Central Venous Catheter


Side:  Left


Location:  Internal, Jugular





A/P


Assessment and Plan


Neuro/Psych:





Acute toxic metabolic encephalopathy secondary hyperglycemia and elevated 

ammonia





Off sedation, monitor neuro status


CT brain 3/17 revealed no acute intracranial findings


Ammonia level is 161 on admission currently 51


Thiamine 100 mg IV daily, folate 1 mg daily and MVI daily


Check EEG


 


CV: 





History of hypertension


Lactic acidosis


Elevated troponin likely type II non-STEMI due to demand ischemia





Place on Lopressor 25mg Q12 monitor HR and BP keep MAP>65mmHg


Lactate is currently 1.9 from 4.5 on arrival


Cards is following- Dr. Baltazar. Echo showed EF 55-60%


On ASA 81mg daily and Heparin drip- d/c heparin drip if ok with cards





Resp: 





Acute hypoxemic respiratory failure





PR[facility] 15/600/1/5/25


Ventilator bundle


Albuterol/ipratropium aerosols every 4 hours with albuterol aerosols every 2 

hours needed for dyspnea


Spontaneous breathing trials when clinically indicated


CXR today- clear lungs





GI: 





Hyperammonia


Elevated Lipase level. 








On Glucerna 1.5 goal 50 cc an hour


Famotidine 20 mg daily for GI prophylaxis


Docusate sodium/senna 1 tablet twice daily for bowel regimen


Change Lactulose 30 cc every 4 hours for elevated ammonia level.  Currently 51


Liver ultrasound revealed no acute finding.  Hepatitis panel negative.


Monitor Lipase level.  Check CT abd/pelvis





Endo:  





Diabetic ketoacidosis- resolved


Hyperglycemia





Change SSI to medium scale and decrease Levemir 10u BID





Renal: 


Acute kidney injury


Hypernatremia





Monitor renal function, I/O's, avoid nephrotoxins. Will need K, Phos 

replacement today


Cr: 2.1, UOP: 1650ml in 24 hrs


Place on 1/2 NS@84ml/hr





Heme:





Leukocytosis neutrophil predominant


Normocytic anemia





Monitor CBC, coags, d/c heparin drip and check Hep PLT ab








ID:





Cystitis





On piperacillin/tazobactam monitor for signs of infections ( Fever, WBC)


Received 1 dose of vancomycin ED





Blood cultures, Influenza screening, strep pneumonia and Legionella urinary Ag 

all negative





MSK:





PT evaluate and treat





Access


-Utilize peripheral IV. Left IJ CVP and right femoral Art line 3/17


Prophylaxis


-GI -famotidine


-DVT -SCD/ hold heparin drip.





Level 3











Michelle Castaneda MD Mar 19, 2018 09:42

## 2018-03-19 NOTE — PD.CARD.PN
Subjective


Subjective Remarks


Intubated, off pressors and hypertensive





Objective


Medications





Current Medications








 Medications


  (Trade)  Dose


 Ordered  Sig/Rogelio


 Route  Start Time


 Stop Time Status Last Admin


 


 Potassium Chloride  100 ml @ 


 100 mls/hr  Q1H  PRN


 IV  3/17/18 12:30


    3/18/18 03:58


 


 


 Potassium Chloride  100 ml @ 


 50 mls/hr  Q2H  PRN


 IV  3/17/18 12:30


     


 


 


 Potassium Chloride  100 ml @ 


 100 mls/hr  Q1H  PRN


 IV  3/17/18 12:30


     


 


 


 Potassium Chloride  100 ml @ 


 100 mls/hr  Q1H  PRN


 IV  3/17/18 12:30


     


 


 


 Potassium Chloride  100 ml @ 


 50 mls/hr  Q2H  PRN


 IV  3/17/18 12:30


     


 


 


 Potassium Chloride  100 ml @ 


 50 mls/hr  Q2H  PRN


 IV  3/17/18 12:30


     


 


 


 Potassium Chloride  100 ml @ 


 50 mls/hr  Q2H  PRN


 IV  3/17/18 12:30


     


 


 


 Potassium Chloride  100 ml @ 


 50 mls/hr  Q2H  PRN


 IV  3/17/18 12:30


     


 


 


  (Sodium


 Bicarbonate 8.4%


 Inj)  100 meq  UNSCH  PRN


 IV PUSH  3/17/18 12:30


    3/17/18 13:19


 


 


  (Sodium


 Bicarbonate 8.4%


 Inj)  50 meq  UNSCH  PRN


 IV PUSH  3/17/18 12:30


     


 


 


 Sodium Phosphate


 15 mmol/Sodium


 Chloride  105 ml @ 


 25 mls/hr  UNSCH  PRN


 IV  3/17/18 12:30


     


 


 


  (NS Flush)  2 ml  UNSCH  PRN


 IV FLUSH  3/17/18 13:15


     


 


 


  (NS Flush)  2 ml  BID


 IV FLUSH  3/17/18 21:00


    3/19/18 09:00


 


 


  (Pepcid Inj)  20 mg  Q12HR


 IV PUSH  3/17/18 21:00


    3/19/18 09:09


 


 


  (Tears Naturale


 Opth Soln)  1 drop  TID


 EACH EYE  3/17/18 18:00


    3/19/18 17:19


 


 


  (Zofran Inj)  4 mg  Q6H  PRN


 IV PUSH  3/17/18 13:15


     


 


 


  (Duoneb Neb)  1 ampule  Q4HR  NEB


 INH  3/17/18 16:00


    3/19/18 16:22


 


 


  (Albuterol Neb)  2.5 mg  Q2HR NEB  PRN


 INH  3/17/18 13:15


     


 


 


 Miscellaneous


 Information  1  Q361D


 XX  3/17/18 13:15


    3/17/18 20:53


 


 


  (Chlorhexidine


 2% Cloth)  3 pack


 Taper  DAILY@04


 TOP  3/18/18 04:00


 3/14/19 03:59  3/19/18 01:42


 


 


  (Chlorhexidine


 2% Cloth)  3 pack  UNSCH  PRN


 TOP  3/17/18 13:15


     


 


 


  (Sully-Colace)  1 tab  BID


 PO  3/17/18 21:00


    3/18/18 09:22


 


 


  (Milk Of


 Magnesia Liq)  30 ml  Q12H  PRN


 PO  3/17/18 13:15


     


 


 


  (Senokot)  17.2 mg  Q12H  PRN


 PO  3/17/18 13:15


     


 


 


  (Dulcolax Supp)  10 mg  DAILY  PRN


 RECTAL  3/17/18 13:15


     


 


 


  (Lactulose Liq)  30 ml  DAILY  PRN


 PO  3/17/18 13:15


    3/17/18 20:48


 


 


  (Peridex 0.12%


 Liq)  15 ml  BID@08,20


 MT  3/17/18 20:00


    3/19/18 08:00


 


 


 Thiamine HCl 100


 mg/Sodium Chloride  101 ml @ 


 101 mls/hr  DAILY


 IV  3/18/18 09:00


    3/19/18 09:15


 


 


  (Folate)  1 mg  DAILY


 PO  3/18/18 09:00


    3/19/18 09:09


 


 


  (Theragran)  1 tab  DAILY


 PO  3/18/18 09:00


    3/19/18 09:09


 


 


 Piperacillin Sod/


 Tazobactam Sod  50 ml @ 


 100 mls/hr  Q6H


 IV  3/17/18 20:00


    3/19/18 16:46


 


 


  (Brethine Inj)  1 mg  UNSCH  PRN


 SQ  3/17/18 16:30


     


 


 


  (NS Flush)    DAILY


 IV FLUSH  3/17/18 17:00


    3/19/18 09:00


 


 


  (NS Flush)    UNSCH  PRN


 IV FLUSH  3/17/18 17:00


     


 


 


  (Aspirin Chew)  81 mg  DAILY


 CHEW  3/19/18 09:00


    3/19/18 09:00


 


 


  (Levemir Inj)  10 units  BID


 SQ  3/19/18 21:00


     


 


 


  (Lactulose Liq)  30 ml  Q4H


 PO  3/19/18 13:00


    3/19/18 17:35


 


 


  (D50w (Vial) Inj)  50 ml  UNSCH  PRN


 IV PUSH  3/19/18 09:30


     


 


 


  (Glucagon Inj)  1 mg  UNSCH  PRN


 OTHER  3/19/18 09:30


     


 


 


  (NovoLIN R


 SUPPLEMENTAL


 SCALE)  1  Q4HR


 SQ  3/19/18 09:30


    3/19/18 16:00


 


 


 Sodium Chloride  1,000 ml @ 


 84 mls/hr  [id number]


 IV  3/19/18 09:30


    3/19/18 10:39


 


 


  (Lopressor)  25 mg  Q12HR


 PO  3/19/18 10:00


    3/19/18 10:39


 


 


  (Apresoline Inj)  10 mg  Q6H  PRN


 IV PUSH  3/19/18 10:00


    3/19/18 12:46


 


 


 Dexmedetomidine


 HCl 200 mcg/


 Sodium Chloride  52 ml @ 


 3.93 mls/hr  TITRATE  PRN


 IV  3/19/18 14:15


    3/19/18 17:21


 








Vital Signs / I&O





Vital Signs








  Date Time  Temp Pulse Resp B/P (MAP) Pulse Ox O2 Delivery O2 Flow Rate FiO2


 


3/19/18 18:00  75      


 


3/19/18 16:20     100   25


 


3/19/18 16:19     100   100


 


3/19/18 16:00        25


 


3/19/18 16:00 100.2 91 18 142/79 (100) 100   


 


3/19/18 16:00  91      


 


3/19/18 14:00  96      


 


3/19/18 13:00  123  240/114 (156)    


 


3/19/18 12:39     100   25


 


3/19/18 12:39        25


 


3/19/18 12:00  116      


 


3/19/18 12:00 99.5 116 25 203/99 (133) 98   


 


3/19/18 12:00        25


 


3/19/18 10:00  97      


 


3/19/18 08:00 98.8 107 16 208/97 (134) 100   


 


3/19/18 08:00        25


 


3/19/18 08:00  107      


 


3/19/18 07:51     100   25


 


3/19/18 07:51        25


 


3/19/18 07:00  82  126/63 (84)    


 


3/19/18 06:00  81      


 


3/19/18 04:27     92   25


 


3/19/18 04:00        25


 


3/19/18 04:00  87      


 


3/19/18 04:00 97.8 75 16 97/51 (66) 100   


 


3/19/18 02:00  87      


 


3/19/18 01:42  88  183/80    


 


3/19/18 00:00 98.0 88 16 183/80 (114)    


 


3/19/18 00:00  88      


 


3/19/18 00:00        25


 


3/18/18 23:28     100   25


 


3/18/18 22:00  77      


 


3/18/18 20:00  75      


 


3/18/18 20:00        25


 


3/18/18 20:00 97.7 75 16 135/65 (88) 100   


 


3/18/18 19:43     100   25


 


3/18/18 19:00  73  135/65 (88)    














I/O      


 


 3/18/18 3/18/18 3/18/18 3/19/18 3/19/18 3/19/18





 07:00 15:00 23:00 07:00 15:00 23:00


 


Intake Total 4097 ml 2100 ml 530 ml  201 ml 1049 ml


 


Output Total 1200 ml  700 ml 950 ml  1600 ml


 


Balance 2897 ml 2100 ml -170 ml -950 ml 201 ml -551 ml


 


      


 


Intake Oral 0 ml     0 ml


 


IV Total 3997 ml 2100 ml 501 ml  201 ml 817 ml


 


Tube Feeding   29 ml   32 ml


 


Tube Irrigant      200 ml


 


Other 100 ml     


 


Output Urine Total 1200 ml  700 ml 950 ml  1300 ml


 


Stool Total      300 ml


 


# Bowel Movements 0  0 2  3








Physical Exam


GENERAL: Intubated, on the vent


SKIN: Warm and dry.


HEAD: Normocephalic.


EYES: No scleral icterus. No injection or drainage. 


NECK: Supple, trachea midline. No JVD or lymphadenopathy.


CARDIOVASCULAR: Regular rate and rhythm without murmurs, gallops, or rubs. 


RESPIRATORY: Breath sounds equal bilaterally. No accessory muscle use.


GASTROINTESTINAL: Abdomen soft, non-tender, nondistended. 


MUSCULOSKELETAL: No cyanosis, or edema.





Laboratory





Laboratory Tests








Test


  3/19/18


01:43 3/19/18


06:16 3/19/18


08:55 3/19/18


09:40


 


Activated Partial


Thromboplast Time 35.2 SEC 


  


  


  42.3 SEC 


 


 


Prothrombin Time  11.4 SEC   


 


Prothromb Time International


Ratio 


  1.1 RATIO 


  


  


 


 


Fibrinogen  424 mg/dL   


 


Blood Urea Nitrogen  29 MG/DL   


 


Creatinine  2.16 MG/DL   


 


Random Glucose  138 MG/DL   


 


Total Protein  4.8 GM/DL   


 


Albumin  2.4 GM/DL   


 


Calcium Level  7.4 MG/DL   


 


Phosphorus Level  1.8 MG/DL   


 


Magnesium Level  1.9 MG/DL   


 


Alkaline Phosphatase  96 U/L   


 


Aspartate Amino Transf


(AST/SGOT) 


  28 U/L 


  


  


 


 


Alanine Aminotransferase


(ALT/SGPT) 


  30 U/L 


  


  


 


 


Total Bilirubin  0.3 MG/DL   


 


Sodium Level  149 MEQ/L   


 


Potassium Level  3.3 MEQ/L   


 


Chloride Level  118 MEQ/L   


 


Carbon Dioxide Level  20.2 MEQ/L   


 


Anion Gap  11 MEQ/L   


 


Estimat Glomerular Filtration


Rate 


  31 ML/MIN 


  


  


 


 


Hemoglobin A1c  10.0 %   


 


Lactic Acid Level  1.9 mmol/L   


 


Protein Corrected Calcium  8.7 MG/DL   


 


Ammonia  51 MCMOL/L   


 


Total Creatine Kinase  338 U/L   


 


Creatine Kinase MB  4.4 NG/ML   


 


Creatine Kinase MB %  1.3 %   


 


Troponin I  0.27 NG/ML   


 


Triglycerides Level  408 MG/DL   


 


Cholesterol Level  145 MG/DL   


 


LDL Cholesterol   MG/DL   


 


HDL Cholesterol  22.9 MG/DL   


 


Cholesterol/HDL Ratio  6.33 RATIO   


 


Amylase Level  547 U/L   


 


Lipase  1651 U/L   


 


Free Thyroxine  0.88 NG/DL   


 


Random Cortisol  14.6 MCG/DL   


 


Blood Gas Puncture Site   ART LINE  


 


Blood Gas Patient Temperature   98.6  


 


Blood Gas HCO3   19 mmol/L  


 


Blood Gas Base Excess   -4.5 mmol/L  


 


Blood Gas Oxygen Saturation   95 %  


 


Arterial Blood pH   7.40  


 


Arterial Blood Partial


Pressure CO2 


  


  32 mmHg 


  


 


 


Arterial Blood Partial


Pressure O2 


  


  95 mmHg 


  


 


 


Arterial Blood Oxygen Content   15.5 Vol %  


 


Arterial Blood


Carboxyhemoglobin 


  


  1.3 % 


  


 


 


Arterial Blood Methemoglobin   1.6 %  


 


Blood Gas Hemoglobin   11.5 G/DL  


 


Oxygen Delivery Device   VENTILATOR  


 


Blood Gas Ventilator Setting


  


  


  CPAP/EPAP5/IPAP5


  


 


 


Blood Gas Inspired Oxygen   25 %  


 


Test


  3/19/18


10:50 3/19/18


14:15 3/19/18


17:54 


 


 


White Blood Count 6.2 TH/MM3  5.5 TH/MM3   


 


Red Blood Count 3.33 MIL/MM3  3.32 MIL/MM3   


 


Hemoglobin 11.1 GM/DL  11.0 GM/DL   


 


Hematocrit 31.2 %  30.9 %   


 


Mean Corpuscular Volume 93.6 FL  93.2 FL   


 


Mean Corpuscular Hemoglobin 33.2 PG  33.2 PG   


 


Mean Corpuscular Hemoglobin


Concent 35.5 % 


  35.6 % 


  


  


 


 


Red Cell Distribution Width 13.1 %  13.2 %   


 


Platelet Count 86 TH/MM3  81 TH/MM3   


 


Mean Platelet Volume 7.7 FL  7.0 FL   


 


Neutrophils (%) (Auto) 79.6 %  83.2 %   


 


Lymphocytes (%) (Auto) 13.0 %  10.0 %   


 


Monocytes (%) (Auto) 7.0 %  6.6 %   


 


Eosinophils (%) (Auto) 0.1 %  0.0 %   


 


Basophils (%) (Auto) 0.3 %  0.2 %   


 


Neutrophils # (Auto) 4.9 TH/MM3  4.6 TH/MM3   


 


Lymphocytes # (Auto) 0.8 TH/MM3  0.5 TH/MM3   


 


Monocytes # (Auto) 0.4 TH/MM3  0.4 TH/MM3   


 


Eosinophils # (Auto) 0.0 TH/MM3  0.0 TH/MM3   


 


Basophils # (Auto) 0.0 TH/MM3  0.0 TH/MM3   


 


CBC Comment AUTO DIFF  AUTO DIFF   


 


Differential Comment


  AUTO DIFF


CONFIRMED AUTO DIFF


CONFIRMED 


  


 


 


Platelet Estimate LOW  LOW   


 


Platelet Morphology Comment NORMAL  NORMAL   


 


Activated Partial


Thromboplast Time 


  


  37.2 SEC 


  


 








Imaging





Last 24 hours Impressions








Chest X-Ray 3/19/18 0600 Signed





Impressions: 





 Service Date/Time:  Monday, March 19, 2018 05:23 - CONCLUSION: The lungs are 





 clear.     Luca Mcgrath MD 


 


Abdomen/Pelvis CT 3/19/18 0000 Signed





Impressions: 





 Service Date/Time:  Monday, March 19, 2018 15:58 - CONCLUSION:  1. 

Unremarkable 





 unenhanced CT appearance of the pancreas. No gross peripancreatic inflammatory 





 change, pancreatic ductal dilatation or peripancreatic fluid collections. 

Please 





 note that CT examination is not sensitive for acute uncomplicated pancreatitis

, 





 particularly without IV contrast. 2. Trace bilateral pleural effusions with 





 minimal bibasilar consolidation, likely atelectasis. 3. Very trace free fluid 





 along the inferior margin of the liver. 4. Ancillary findings include colonic 





 diverticulosis, left renal cyst and degenerative spondylosis of the lumbar 





 spine.     Ye Mccall MD 











Assessment and Plan


Problem List:  


(1) Respiratory failure


ICD Codes:  J96.90 - Respiratory failure, unspecified, unspecified whether with 

hypoxia or hypercapnia


Status:  Acute


(2) Elevated troponin


ICD Codes:  R74.8 - Abnormal levels of other serum enzymes


(3) Sepsis


ICD Codes:  A41.9 - Sepsis, unspecified organism


Status:  Acute


(4) DKA (diabetic ketoacidoses)


ICD Codes:  E13.10 - Other specified diabetes mellitus with ketoacidosis 

without coma


Status:  Acute


(5) Acute metabolic encephalopathy


ICD Codes:  G93.41 - Metabolic encephalopathy


(6) Acute kidney injury


ICD Codes:  N17.9 - Acute kidney failure, unspecified


(7) ETOH abuse


ICD Codes:  F10.10 - Alcohol abuse, uncomplicated


Assessment and Plan


Echo with nl LV fx. Off pressors. DC heparin. Possibly withdrawing from ETOH. 

EEG planned. Continue ICU care.





Problem Qualifiers





(1) Respiratory failure:  


Qualified Codes:  J96.00 - Acute respiratory failure, unspecified whether with 

hypoxia or hypercapnia


(2) Sepsis:  


Qualified Codes:  A41.9 - Sepsis, unspecified organism


(3) DKA (diabetic ketoacidoses):  


Qualified Codes:  E10.11 - Type 1 diabetes mellitus with ketoacidosis with coma








Silvia Baltazar MD Mar 19, 2018 18:52

## 2018-03-19 NOTE — EKG
Date Performed: 2018       Time Performed: 11:21:43

 

PTAGE:      62 years

 

EKG:      Sinus rhythm 

 

 LOW QRS VOLTAGE IN EXTREMITY LEADS ST ELEVATION, CONSIDER INFERIOR INJURY ***ACUTE MI*** Compared to
 

 

 PREVIOUS TRACING             the patient has anterior ST elevation potentially consisent with acute 
injury pattern Clinical Correlation Requested PREVIOUS TRACIN2018 12.15

 

DOCTOR:   Nallely Escalante  Interpretating Date/Time  2018 10:32:56

## 2018-03-19 NOTE — RADRPT
EXAM DATE/TIME:  03/19/2018 15:58 

 

[facility] COMPARISON:     

No previous studies available for comparison.

 

 

INDICATIONS :     

Elavated lipase

                  

 

ORAL CONTRAST:      

No oral contrast ingested.

                  

 

RADIATION DOSE:     

11.38 CTDIvol (mGy) 

 

 

MEDICAL HISTORY :     

Diabetes mellitus type 1. Hypertension. 

 

SURGICAL HISTORY :      

None. 

 

ENCOUNTER:      

Initial

 

ACUITY:      

1 day

 

PAIN SCALE:      

Non-responsive

 

LOCATION:         

abdomen

 

TECHNIQUE:     

Volumetric scanning of the abdomen and pelvis was performed.  Using automated exposure control and ad
justment of the mA and/or kV according to patient size, radiation dose was kept as low as reasonably 
achievable to obtain optimal diagnostic quality images.  DICOM format image data is available electro
nically for review and comparison.  

 

FINDINGS:     

 

LOWER LUNGS:     

Trace bilateral pleural effusions with minimal bibasilar airspace consolidation.

 

LIVER:     

Homogeneous density without lesion.  There is no dilation of the biliary tree.  No calcified gallston
es.

 

SPLEEN:     

Normal size without lesion.

 

PANCREAS:     

Unremarkable by CT. No significant pancreatic ductal dilatation

 

KIDNEYS:     

2 large simple appearing cysts in the left kidney near the inferior pole measuring 6.5 x 5.5 cm and 8
.9 x 8.5 cm. No radiopaque renal calculi. Kidneys are otherwise unremarkable without hydronephrosis.

 

ADRENAL GLANDS:     

Within normal limits.

 

VASCULAR:     

There is no aortic aneurysm. There is a right femoral line.

 

BOWEL/MESENTERY:     

There is an NGT in the stomach. The stomach, small bowel, and colon demonstrate no acute abnormality.
  Mild sigmoid and scattered colonic diverticula. Very trace free fluid in the inferior margin of the
 liver.

 

ABDOMINAL WALL:     

Within normal limits.

 

RETROPERITONEUM:     

There is no lymphadenopathy.

 

BLADDER:     

Decompressed secondary to Guerrero catheter.

 

REPRODUCTIVE:     

Within normal limits.

 

INGUINAL:     

There is no lymphadenopathy or hernia.

 

MUSCULOSKELETAL:     

Degenerative changes of the lumbar spine without focal abnormal lytic or blastic bony lesions.

 

CONCLUSION:     

1. Unremarkable unenhanced CT appearance of the pancreas. No gross peripancreatic inflammatory change
, pancreatic ductal dilatation or peripancreatic fluid collections. Please note that CT examination i
s not sensitive for acute uncomplicated pancreatitis, particularly without IV contrast.

2. Trace bilateral pleural effusions with minimal bibasilar consolidation, likely atelectasis.

3. Very trace free fluid along the inferior margin of the liver.

4. Ancillary findings include colonic diverticulosis, left renal cyst and degenerative spondylosis of
 the lumbar spine.

 

 

 

 Ye Mccall MD on March 19, 2018 at 16:04           

Board Certified Radiologist.

 This report was verified electronically.

## 2018-03-19 NOTE — ECHRPT
Indication:   HEART FAILURE

 

 CONCLUSIONS

 The left ventricular systolic function is normal with an estimated ejection fraction in the range of
 55-60%. 

 Normal left ventricular size. 

 Moderate concentric left ventricular hypertrophy. 

 No regional wall motion abnormalities are present. 

 Mild mitral annular calcification. 

 

 

 BP:  97    / 51      HR: 87                       Rhythm:           Sinus

 

 MEASUREMENTS  (Male / Female) Normal Values       Technical Quality:Fair

 2D ECHO

 LV Diastolic Diameter PLAX        3.7 cm                4.2 - 5.9 / 3.9 - 5.3 cm

 LV Systolic Diameter PLAX         2.7 cm                

 IVS Diastolic Thickness           1.4 cm                0.6 - 1.0 / 0.6 - 0.9 cm

 LVPW Diastolic Thickness          1.5 cm                0.6 - 1.0 / 0.6 - 0.9 cm

 LV Relative Wall Thickness        0.8                   

 RV Internal Dim ED PLAX           2.9 cm                

 LVOT Diameter                     1.9 cm                

 LA Systolic Diameter LX           3.5 cm                3.0 - 4.0 / 2.7 - 3.8 cm

 

 M-MODE

 Aortic Root Diameter MM           2.6 cm                

 LA Systolic Diameter MM           3.6 cm                

 LA Ao Ratio MM                    1.4                   

 AV Cusp Separation MM             2.2 cm                

 

 DOPPLER

 AV Peak Velocity                  136.0 cm/s            

 AV Peak Gradient                  7.4 mmHg              

 LVOT Peak Velocity                124.0 cm/s            

 LVOT Peak Gradient                6.2 mmHg              

 AV Area Cont Eq pk                2.6 cm               

 MV Area PHT                       4.1 cm               

 Mitral E Point Velocity           74.0 cm/s             

 Mitral A Point Velocity           89.3 cm/s             

 Mitral E to A Ratio               0.8                   

 TR Peak Velocity                  172.0 cm/s            

 TR Peak Gradient                  11.8 mmHg             

 Right Atrial Pressure             10.0 mmHg             

 Pulmonary Artery Systolic Pressu  21.8 mmHg             

 Right Ventricular Systolic Press  21.8 mmHg             

 PV Peak Velocity                  102.0 cm/s            

 PV Peak Gradient                  4.2 mmHg              

 

 

 FINDINGS

 

 LEFT VENTRICLE

 The left ventricular systolic function is normal with an estimated ejection fraction in the range of
 55-60%. 

 Normal left ventricular size. 

 Moderate concentric left ventricular hypertrophy. 

 No regional wall motion abnormalities are present. 

 

 RIGHT VENTRICLE

 Normal right ventricular size and systolic function.  

 

 LEFT ATRIUM

 The left atrial size is normal.  

 

 RIGHT ATRIUM

 The right atrial size is normal.  

 

 ATRIAL SEPTUM

 Normal atrial septal thickness without atrial level shunting by limited color doppler interrogation.
  

 

 AORTA

 The aortic root and proximal ascending aorta are normal in size on limited imaging.  

 

 MITRAL VALVE

 Mild mitral annular calcification. 

 

 AORTIC VALVE

 Trileaflet aortic valve. No aortic valve stenosis or regurgitation.  

 

 TRICUSPID VALVE

 Structurally normal tricuspid valve. No tricuspid valve stenosis or regurgitation.  

 

 PULMONARY VALVE

 The pulmonary valve is not well visualized.  

 

 VESSELS

 The inferior vena cava is normal in size.  

 

 PERICARDIUM

 No pericardial effusion.  

 

 

 

 

  Silvia Baltazar MD, FACC

  (Electronically Signed)

  Final Date:19 March 2018 16:40

## 2018-03-19 NOTE — RADRPT
EXAM DATE/TIME:  03/19/2018 05:23 

 

[facility] COMPARISON:     

CHEST SINGLE AP, March 17, 2018, 17:05.

 

                     

INDICATIONS :     

Shortness of breath, possible pulmonary disease.

                     

 

MEDICAL HISTORY :            

Diabetes   

 

SURGICAL HISTORY :     

None.   

 

ENCOUNTER:     

Subsequent                                        

 

ACUITY:     

3 days      

 

PAIN SCORE:     

Non-responsive.

 

LOCATION:     

Bilateral chest 

 

FINDINGS:     

A single view of the chest demonstrates the lungs to be symmetrically aerated without evidence of mas
s, infiltrate or effusion.  The cardiomediastinal contours are unremarkable.  Osseous structures are 
intact.  ETT tip well above the gianna.  Gastric tube traverses the field-of-view.  Left central line
 tip projects in the mid superior vena cava.

 

CONCLUSION:     The lungs are clear.

 

 

 

 Luca Mcgrath MD on March 19, 2018 at 6:51           

Board Certified Radiologist.

 This report was verified electronically.

## 2018-03-20 VITALS
OXYGEN SATURATION: 98 % | HEART RATE: 67 BPM | DIASTOLIC BLOOD PRESSURE: 81 MMHG | RESPIRATION RATE: 17 BRPM | SYSTOLIC BLOOD PRESSURE: 123 MMHG

## 2018-03-20 VITALS — HEART RATE: 66 BPM

## 2018-03-20 VITALS
RESPIRATION RATE: 15 BRPM | HEART RATE: 69 BPM | TEMPERATURE: 97.5 F | DIASTOLIC BLOOD PRESSURE: 64 MMHG | OXYGEN SATURATION: 100 % | SYSTOLIC BLOOD PRESSURE: 96 MMHG

## 2018-03-20 VITALS
OXYGEN SATURATION: 95 % | TEMPERATURE: 98.3 F | HEART RATE: 82 BPM | RESPIRATION RATE: 15 BRPM | SYSTOLIC BLOOD PRESSURE: 163 MMHG | DIASTOLIC BLOOD PRESSURE: 91 MMHG

## 2018-03-20 VITALS — DIASTOLIC BLOOD PRESSURE: 65 MMHG | SYSTOLIC BLOOD PRESSURE: 101 MMHG | HEART RATE: 65 BPM

## 2018-03-20 VITALS
RESPIRATION RATE: 15 BRPM | SYSTOLIC BLOOD PRESSURE: 121 MMHG | OXYGEN SATURATION: 100 % | HEART RATE: 68 BPM | DIASTOLIC BLOOD PRESSURE: 63 MMHG

## 2018-03-20 VITALS — HEART RATE: 94 BPM

## 2018-03-20 VITALS — OXYGEN SATURATION: 97 %

## 2018-03-20 VITALS — DIASTOLIC BLOOD PRESSURE: 47 MMHG | HEART RATE: 67 BPM | SYSTOLIC BLOOD PRESSURE: 99 MMHG

## 2018-03-20 VITALS — OXYGEN SATURATION: 100 %

## 2018-03-20 VITALS
OXYGEN SATURATION: 100 % | HEART RATE: 68 BPM | TEMPERATURE: 98.1 F | DIASTOLIC BLOOD PRESSURE: 56 MMHG | RESPIRATION RATE: 15 BRPM | SYSTOLIC BLOOD PRESSURE: 107 MMHG

## 2018-03-20 VITALS — DIASTOLIC BLOOD PRESSURE: 51 MMHG | HEART RATE: 71 BPM | SYSTOLIC BLOOD PRESSURE: 97 MMHG

## 2018-03-20 VITALS
SYSTOLIC BLOOD PRESSURE: 99 MMHG | HEART RATE: 64 BPM | RESPIRATION RATE: 15 BRPM | DIASTOLIC BLOOD PRESSURE: 55 MMHG | OXYGEN SATURATION: 100 %

## 2018-03-20 VITALS — HEART RATE: 68 BPM

## 2018-03-20 VITALS — HEART RATE: 75 BPM

## 2018-03-20 VITALS — HEART RATE: 74 BPM

## 2018-03-20 LAB
BASOPHILS # BLD AUTO: 0 TH/MM3 (ref 0–0.2)
BASOPHILS NFR BLD: 0.3 % (ref 0–2)
BUN SERPL-MCNC: 26 MG/DL (ref 7–18)
CALCIUM SERPL-MCNC: 8.1 MG/DL (ref 8.5–10.1)
CHLORIDE SERPL-SCNC: 117 MEQ/L (ref 98–107)
CREAT SERPL-MCNC: 1.75 MG/DL (ref 0.6–1.3)
EOSINOPHIL # BLD: 0 TH/MM3 (ref 0–0.4)
EOSINOPHIL NFR BLD: 0 % (ref 0–4)
ERYTHROCYTE [DISTWIDTH] IN BLOOD BY AUTOMATED COUNT: 13 % (ref 11.6–17.2)
GFR SERPLBLD BASED ON 1.73 SQ M-ARVRAT: 40 ML/MIN (ref 89–?)
GLUCOSE SERPL-MCNC: 278 MG/DL (ref 74–106)
HCO3 BLD-SCNC: 19.7 MEQ/L (ref 21–32)
HCT VFR BLD CALC: 29.5 % (ref 39–51)
HGB BLD-MCNC: 10.5 GM/DL (ref 13–17)
LYMPHOCYTES # BLD AUTO: 0.7 TH/MM3 (ref 1–4.8)
LYMPHOCYTES NFR BLD AUTO: 16.8 % (ref 9–44)
MAGNESIUM SERPL-MCNC: 2.2 MG/DL (ref 1.5–2.5)
MCH RBC QN AUTO: 33.4 PG (ref 27–34)
MCHC RBC AUTO-ENTMCNC: 35.6 % (ref 32–36)
MCV RBC AUTO: 94 FL (ref 80–100)
MONOCYTE #: 0.3 TH/MM3 (ref 0–0.9)
MONOCYTES NFR BLD: 7.7 % (ref 0–8)
NEUTROPHILS # BLD AUTO: 3.2 TH/MM3 (ref 1.8–7.7)
NEUTROPHILS NFR BLD AUTO: 75.2 % (ref 16–70)
PF4 HEPARIN CMPLX AB SER QL: NEGATIVE
PHOSPHATE SERPL-MCNC: 2.9 MG/DL (ref 2.5–4.9)
PLATELET # BLD: 87 TH/MM3 (ref 150–450)
PMV BLD AUTO: 7.5 FL (ref 7–11)
RBC # BLD AUTO: 3.14 MIL/MM3 (ref 4.5–5.9)
SODIUM SERPL-SCNC: 147 MEQ/L (ref 136–145)
WBC # BLD AUTO: 4.2 TH/MM3 (ref 4–11)

## 2018-03-20 RX ADMIN — Medication SCH ML: at 09:00

## 2018-03-20 RX ADMIN — ACYCLOVIR SCH UNITS: 800 TABLET ORAL at 08:29

## 2018-03-20 RX ADMIN — IPRATROPIUM BROMIDE AND ALBUTEROL SULFATE SCH AMPULE: .5; 3 SOLUTION RESPIRATORY (INHALATION) at 16:07

## 2018-03-20 RX ADMIN — STANDARDIZED SENNA CONCENTRATE AND DOCUSATE SODIUM SCH TAB: 8.6; 5 TABLET, FILM COATED ORAL at 20:48

## 2018-03-20 RX ADMIN — TAZOBACTAM SODIUM AND PIPERACILLIN SODIUM SCH MLS/HR: 375; 3 INJECTION, SOLUTION INTRAVENOUS at 13:01

## 2018-03-20 RX ADMIN — DEXMEDETOMIDINE HYDROCHLORIDE PRN MLS/HR: 100 INJECTION, SOLUTION, CONCENTRATE INTRAVENOUS at 10:06

## 2018-03-20 RX ADMIN — WATER SCH ML: 1 IRRIGANT IRRIGATION at 20:49

## 2018-03-20 RX ADMIN — POLYVINYL ALCOHOL SCH DROP: 14 SOLUTION/ DROPS OPHTHALMIC at 18:00

## 2018-03-20 RX ADMIN — WATER SCH ML: 1 IRRIGANT IRRIGATION at 08:27

## 2018-03-20 RX ADMIN — METOPROLOL TARTRATE SCH MG: 25 TABLET, FILM COATED ORAL at 08:27

## 2018-03-20 RX ADMIN — TAZOBACTAM SODIUM AND PIPERACILLIN SODIUM SCH MLS/HR: 250; 2 INJECTION, SOLUTION INTRAVENOUS at 08:28

## 2018-03-20 RX ADMIN — WATER SCH ML: 1 IRRIGANT IRRIGATION at 17:00

## 2018-03-20 RX ADMIN — WATER SCH ML: 1 IRRIGANT IRRIGATION at 04:11

## 2018-03-20 RX ADMIN — Medication SCH ML: at 08:29

## 2018-03-20 RX ADMIN — DEXMEDETOMIDINE HYDROCHLORIDE PRN MLS/HR: 100 INJECTION, SOLUTION, CONCENTRATE INTRAVENOUS at 07:09

## 2018-03-20 RX ADMIN — WATER SCH ML: 1 IRRIGANT IRRIGATION at 13:02

## 2018-03-20 RX ADMIN — HUMAN INSULIN SCH: 100 INJECTION, SOLUTION SUBCUTANEOUS at 08:45

## 2018-03-20 RX ADMIN — IPRATROPIUM BROMIDE AND ALBUTEROL SULFATE SCH AMPULE: .5; 3 SOLUTION RESPIRATORY (INHALATION) at 07:44

## 2018-03-20 RX ADMIN — IPRATROPIUM BROMIDE AND ALBUTEROL SULFATE SCH AMPULE: .5; 3 SOLUTION RESPIRATORY (INHALATION) at 11:48

## 2018-03-20 RX ADMIN — POLYVINYL ALCOHOL SCH DROP: 14 SOLUTION/ DROPS OPHTHALMIC at 09:00

## 2018-03-20 RX ADMIN — WATER SCH ML: 1 IRRIGANT IRRIGATION at 00:02

## 2018-03-20 RX ADMIN — IPRATROPIUM BROMIDE AND ALBUTEROL SULFATE SCH AMPULE: .5; 3 SOLUTION RESPIRATORY (INHALATION) at 19:56

## 2018-03-20 RX ADMIN — IPRATROPIUM BROMIDE AND ALBUTEROL SULFATE SCH AMPULE: .5; 3 SOLUTION RESPIRATORY (INHALATION) at 23:22

## 2018-03-20 RX ADMIN — TAZOBACTAM SODIUM AND PIPERACILLIN SODIUM SCH MLS/HR: 250; 2 INJECTION, SOLUTION INTRAVENOUS at 01:59

## 2018-03-20 RX ADMIN — ASPIRIN 81 MG SCH MG: 81 TABLET ORAL at 08:27

## 2018-03-20 RX ADMIN — POLYVINYL ALCOHOL SCH DROP: 14 SOLUTION/ DROPS OPHTHALMIC at 13:00

## 2018-03-20 RX ADMIN — FOLIC ACID SCH MLS/HR: 5 INJECTION, SOLUTION INTRAMUSCULAR; INTRAVENOUS; SUBCUTANEOUS at 10:27

## 2018-03-20 RX ADMIN — FOLIC ACID SCH MG: 1 TABLET ORAL at 08:27

## 2018-03-20 RX ADMIN — THIAMINE HYDROCHLORIDE SCH MLS/HR: 100 INJECTION, SOLUTION INTRAMUSCULAR; INTRAVENOUS at 20:47

## 2018-03-20 RX ADMIN — METOPROLOL TARTRATE SCH MG: 25 TABLET, FILM COATED ORAL at 20:48

## 2018-03-20 RX ADMIN — FAMOTIDINE SCH MG: 10 INJECTION, SOLUTION INTRAVENOUS at 20:58

## 2018-03-20 RX ADMIN — HUMAN INSULIN SCH: 100 INJECTION, SOLUTION SUBCUTANEOUS at 04:11

## 2018-03-20 RX ADMIN — THIAMINE HYDROCHLORIDE SCH MLS/HR: 100 INJECTION, SOLUTION INTRAMUSCULAR; INTRAVENOUS at 03:14

## 2018-03-20 RX ADMIN — DEXMEDETOMIDINE HYDROCHLORIDE PRN MLS/HR: 100 INJECTION, SOLUTION, CONCENTRATE INTRAVENOUS at 04:04

## 2018-03-20 RX ADMIN — DEXMEDETOMIDINE HYDROCHLORIDE PRN MLS/HR: 100 INJECTION, SOLUTION, CONCENTRATE INTRAVENOUS at 01:32

## 2018-03-20 RX ADMIN — HUMAN INSULIN SCH: 100 INJECTION, SOLUTION SUBCUTANEOUS at 12:00

## 2018-03-20 RX ADMIN — CHLORHEXIDINE GLUCONATE 0.12% ORAL RINSE SCH ML: 1.2 LIQUID ORAL at 20:00

## 2018-03-20 RX ADMIN — TAZOBACTAM SODIUM AND PIPERACILLIN SODIUM SCH MLS/HR: 375; 3 INJECTION, SOLUTION INTRAVENOUS at 20:48

## 2018-03-20 RX ADMIN — HUMAN INSULIN SCH: 100 INJECTION, SOLUTION SUBCUTANEOUS at 17:10

## 2018-03-20 RX ADMIN — Medication SCH ML: at 20:50

## 2018-03-20 RX ADMIN — IPRATROPIUM BROMIDE AND ALBUTEROL SULFATE SCH AMPULE: .5; 3 SOLUTION RESPIRATORY (INHALATION) at 03:49

## 2018-03-20 RX ADMIN — STANDARDIZED SENNA CONCENTRATE AND DOCUSATE SODIUM SCH TAB: 8.6; 5 TABLET, FILM COATED ORAL at 08:27

## 2018-03-20 RX ADMIN — CHLORHEXIDINE GLUCONATE SCH PACK: 500 CLOTH TOPICAL at 04:13

## 2018-03-20 RX ADMIN — ACYCLOVIR SCH TAB: 800 TABLET ORAL at 08:27

## 2018-03-20 RX ADMIN — HUMAN INSULIN SCH: 100 INJECTION, SOLUTION SUBCUTANEOUS at 20:48

## 2018-03-20 RX ADMIN — DEXMEDETOMIDINE HYDROCHLORIDE PRN MLS/HR: 100 INJECTION, SOLUTION, CONCENTRATE INTRAVENOUS at 12:08

## 2018-03-20 RX ADMIN — ACYCLOVIR SCH UNITS: 800 TABLET ORAL at 20:49

## 2018-03-20 RX ADMIN — FOLIC ACID SCH MLS/HR: 5 INJECTION, SOLUTION INTRAMUSCULAR; INTRAVENOUS; SUBCUTANEOUS at 10:34

## 2018-03-20 NOTE — PD.CARD.PN
Subjective


Subjective Remarks


Intubated, sedated





Objective


Medications





Current Medications








 Medications


  (Trade)  Dose


 Ordered  Sig/Rogelio


 Route  Start Time


 Stop Time Status Last Admin


 


 Potassium Chloride  100 ml @ 


 100 mls/hr  Q1H  PRN


 IV  3/17/18 12:30


    3/18/18 03:58


 


 


 Potassium Chloride  100 ml @ 


 50 mls/hr  Q2H  PRN


 IV  3/17/18 12:30


     


 


 


 Potassium Chloride  100 ml @ 


 100 mls/hr  Q1H  PRN


 IV  3/17/18 12:30


     


 


 


 Potassium Chloride  100 ml @ 


 100 mls/hr  Q1H  PRN


 IV  3/17/18 12:30


     


 


 


 Potassium Chloride  100 ml @ 


 50 mls/hr  Q2H  PRN


 IV  3/17/18 12:30


     


 


 


 Potassium Chloride  100 ml @ 


 50 mls/hr  Q2H  PRN


 IV  3/17/18 12:30


     


 


 


 Potassium Chloride  100 ml @ 


 50 mls/hr  Q2H  PRN


 IV  3/17/18 12:30


     


 


 


 Potassium Chloride  100 ml @ 


 50 mls/hr  Q2H  PRN


 IV  3/17/18 12:30


     


 


 


  (Sodium


 Bicarbonate 8.4%


 Inj)  100 meq  UNSCH  PRN


 IV PUSH  3/17/18 12:30


    3/17/18 13:19


 


 


  (Sodium


 Bicarbonate 8.4%


 Inj)  50 meq  UNSCH  PRN


 IV PUSH  3/17/18 12:30


     


 


 


 Sodium Phosphate


 15 mmol/Sodium


 Chloride  105 ml @ 


 25 mls/hr  UNSCH  PRN


 IV  3/17/18 12:30


     


 


 


  (NS Flush)  2 ml  UNSCH  PRN


 IV FLUSH  3/17/18 13:15


     


 


 


  (NS Flush)  2 ml  BID


 IV FLUSH  3/17/18 21:00


    3/20/18 08:29


 


 


  (Pepcid Inj)  20 mg  Q12HR


 IV PUSH  3/17/18 21:00


    3/19/18 20:18


 


 


  (Tears Naturale


 Opth Soln)  1 drop  TID


 EACH EYE  3/17/18 18:00


    3/19/18 17:19


 


 


  (Zofran Inj)  4 mg  Q6H  PRN


 IV PUSH  3/17/18 13:15


     


 


 


  (Duoneb Neb)  1 ampule  Q4HR  NEB


 INH  3/17/18 16:00


    3/20/18 11:48


 


 


  (Albuterol Neb)  2.5 mg  Q2HR NEB  PRN


 INH  3/17/18 13:15


     


 


 


 Miscellaneous


 Information  1  Q361D


 XX  3/17/18 13:15


    3/17/18 20:53


 


 


  (Chlorhexidine


 2% Cloth)  3 pack


 Taper  DAILY@04


 TOP  3/18/18 04:00


 3/14/19 03:59  3/20/18 04:13


 


 


  (Chlorhexidine


 2% Cloth)  3 pack  UNSCH  PRN


 TOP  3/17/18 13:15


     


 


 


  (Sully-Colace)  1 tab  BID


 PO  3/17/18 21:00


    3/20/18 08:27


 


 


  (Milk Of


 Magnesia Liq)  30 ml  Q12H  PRN


 PO  3/17/18 13:15


     


 


 


  (Senokot)  17.2 mg  Q12H  PRN


 PO  3/17/18 13:15


     


 


 


  (Dulcolax Supp)  10 mg  DAILY  PRN


 RECTAL  3/17/18 13:15


     


 


 


  (Lactulose Liq)  30 ml  DAILY  PRN


 PO  3/17/18 13:15


    3/17/18 20:48


 


 


  (Peridex 0.12%


 Liq)  15 ml  BID@08,20


 MT  3/17/18 20:00


    3/19/18 20:23


 


 


 Thiamine HCl 100


 mg/Sodium Chloride  101 ml @ 


 101 mls/hr  DAILY


 IV  3/18/18 09:00


    3/20/18 10:34


 


 


  (Folate)  1 mg  DAILY


 PO  3/18/18 09:00


    3/20/18 08:27


 


 


  (Theragran)  1 tab  DAILY


 PO  3/18/18 09:00


    3/20/18 08:27


 


 


  (Brethine Inj)  1 mg  UNSCH  PRN


 SQ  3/17/18 16:30


     


 


 


  (NS Flush)    DAILY


 IV FLUSH  3/17/18 17:00


    3/19/18 09:00


 


 


  (NS Flush)    UNSCH  PRN


 IV FLUSH  3/17/18 17:00


     


 


 


  (Aspirin Chew)  81 mg  DAILY


 CHEW  3/19/18 09:00


    3/20/18 08:27


 


 


  (Levemir Inj)  10 units  BID


 SQ  3/19/18 21:00


    3/20/18 08:29


 


 


  (Lactulose Liq)  30 ml  Q4H


 PO  3/19/18 13:00


    3/20/18 13:02


 


 


  (D50w (Vial) Inj)  50 ml  UNSCH  PRN


 IV PUSH  3/19/18 09:30


     


 


 


  (Glucagon Inj)  1 mg  UNSCH  PRN


 OTHER  3/19/18 09:30


     


 


 


  (NovoLIN R


 SUPPLEMENTAL


 SCALE)  1  Q4HR


 SQ  3/19/18 09:30


    3/20/18 08:45


 


 


 Sodium Chloride  1,000 ml @ 


 84 mls/hr  [id number]


 IV  3/19/18 09:30


    3/20/18 03:14


 


 


  (Lopressor)  25 mg  Q12HR


 PO  3/19/18 10:00


    3/20/18 08:27


 


 


  (Apresoline Inj)  10 mg  Q6H  PRN


 IV PUSH  3/19/18 10:00


    3/19/18 12:46


 


 


 Dexmedetomidine


 HCl 200 mcg/


 Sodium Chloride  52 ml @ 


 3.93 mls/hr  TITRATE  PRN


 IV  3/19/18 14:15


    3/20/18 12:08


 


 


 Norepinephrine


 Bitartrate  250 ml @ 


 7.5 mls/hr  TITRATE  PRN


 IV  3/20/18 01:00


    3/20/18 01:31


 


 


 Fentanyl Citrate  250 ml @ 5


 mls/hr  TITRATE  PRN


 IV  3/20/18 03:00


    3/20/18 03:01


 


 


  (Versed Inj)  2 mg  Q1H  PRN


 IV PUSH  3/20/18 03:00


     


 


 


 Piperacillin Sod/


 Tazobactam Sod  50 ml @ 


 100 mls/hr  Q6H


 IV  3/20/18 14:00


    3/20/18 13:01


 








Vital Signs / I&O





Vital Signs








  Date Time  Temp Pulse Resp B/P (MAP) Pulse Ox O2 Delivery O2 Flow Rate FiO2


 


3/20/18 14:41     97 Nasal Cannula 4 


 


3/20/18 14:40     97 Nasal Cannula 4.00 


 


3/20/18 14:00  66      


 


3/20/18 13:57        25


 


3/20/18 13:00  65  98/65 (76)    





    101/55 (70)    


 


3/20/18 12:00 97.7 64 15 96/63 (74) 100   





    99/55 (70)    


 


3/20/18 12:00  64      


 


3/20/18 12:00        25


 


3/20/18 11:33     100   25


 


3/20/18 10:00  66      


 


3/20/18 08:00        25


 


3/20/18 08:00  68      


 


3/20/18 08:00 98.2 68 15 115/79 (91) 100   





    121/63 (82)    


 


3/20/18 07:44     100   25


 


3/20/18 07:00  67  99/57 (71)    





    102/47 (65)    


 


3/20/18 06:00  68      


 


3/20/18 04:36     100   25


 


3/20/18 04:00 98.1 68 15 109/73 (85) 100   





    107/56 (73)    


 


3/20/18 04:00  75      


 


3/20/18 04:00        25


 


3/20/18 02:00  75      


 


3/20/18 01:50     100   25


 


3/20/18 01:31  71  100/53    


 


3/20/18 01:00  71  97/51 (66)    


 


3/20/18 00:00  69      


 


3/20/18 00:00 97.5 69 15 96/64 (75) 100   





    91/47 (62)    


 


3/20/18 00:00        25


 


3/19/18 23:10     100   25


 


3/19/18 22:00  71      


 


3/19/18 20:15     100   25


 


3/19/18 20:00  74      


 


3/19/18 20:00 98.9 74 15 102/63 (76) 100   





    106/62 (77)    


 


3/19/18 20:00        25


 


3/19/18 19:00  86  159/92 (114)    


 


3/19/18 18:00  75      


 


3/19/18 16:20     100   25


 


3/19/18 16:19     100   100


 


3/19/18 16:00        25


 


3/19/18 16:00 100.2 91 18 142/79 (100) 100   


 


3/19/18 16:00  91      














I/O      


 


 3/19/18 3/19/18 3/19/18 3/20/18 3/20/18 3/20/18





 07:00 15:00 23:00 07:00 15:00 23:00


 


Intake Total  201 ml 1099 ml 1623 ml  


 


Output Total 950 ml  1600 ml 1650 ml  


 


Balance -950 ml 201 ml -501 ml -27 ml  


 


      


 


Intake Oral   0 ml   


 


IV Total  201 ml 867 ml 1150 ml  


 


Tube Feeding   32 ml 373 ml  


 


Tube Irrigant   200 ml   


 


Other    100 ml  


 


Output Urine Total 950 ml  1300 ml 1150 ml  


 


Stool Total   300 ml 500 ml  


 


# Bowel Movements 2  3   








Physical Exam


GENERAL: Intubated, on the vent


SKIN: Warm and dry.


HEAD: Normocephalic.


EYES: No scleral icterus. No injection or drainage. 


NECK: Supple, trachea midline. No JVD or lymphadenopathy.


CARDIOVASCULAR: Regular rate and rhythm without murmurs, gallops, or rubs. 


RESPIRATORY: Breath sounds equal bilaterally. No accessory muscle use.


GASTROINTESTINAL: Abdomen soft, non-tender, nondistended. 


MUSCULOSKELETAL: No cyanosis, or edema.





Laboratory





Laboratory Tests








Test


  3/19/18


17:54 3/19/18


21:00 3/20/18


05:30 3/20/18


12:50


 


Activated Partial


Thromboplast Time 37.2 SEC 


  


  


  


 


 


Potassium Level  3.4 MEQ/L  3.8 MEQ/L  


 


Phosphorus Level  2.5 MG/DL  2.9 MG/DL  


 


White Blood Count   4.2 TH/MM3  


 


Red Blood Count   3.14 MIL/MM3  


 


Hemoglobin   10.5 GM/DL  


 


Hematocrit   29.5 %  


 


Mean Corpuscular Volume   94.0 FL  


 


Mean Corpuscular Hemoglobin   33.4 PG  


 


Mean Corpuscular Hemoglobin


Concent 


  


  35.6 % 


  


 


 


Red Cell Distribution Width   13.0 %  


 


Platelet Count   87 TH/MM3  


 


Mean Platelet Volume   7.5 FL  


 


Neutrophils (%) (Auto)   75.2 %  


 


Lymphocytes (%) (Auto)   16.8 %  


 


Monocytes (%) (Auto)   7.7 %  


 


Eosinophils (%) (Auto)   0.0 %  


 


Basophils (%) (Auto)   0.3 %  


 


Neutrophils # (Auto)   3.2 TH/MM3  


 


Lymphocytes # (Auto)   0.7 TH/MM3  


 


Monocytes # (Auto)   0.3 TH/MM3  


 


Eosinophils # (Auto)   0.0 TH/MM3  


 


Basophils # (Auto)   0.0 TH/MM3  


 


CBC Comment   AUTO DIFF  


 


Differential Comment


  


  


  AUTO DIFF


CONFIRMED 


 


 


Platelet Estimate   LOW  


 


Platelet Morphology Comment   NORMAL  


 


Blood Urea Nitrogen   26 MG/DL  


 


Creatinine   1.75 MG/DL  


 


Random Glucose   278 MG/DL  


 


Calcium Level   8.1 MG/DL  


 


Magnesium Level   2.2 MG/DL  


 


Sodium Level   147 MEQ/L  


 


Chloride Level   117 MEQ/L  


 


Carbon Dioxide Level   19.7 MEQ/L  


 


Anion Gap   10 MEQ/L  


 


Estimat Glomerular Filtration


Rate 


  


  40 ML/MIN 


  


 


 


Amylase Level   128 U/L  


 


Lipase   292 U/L  


 


Urine Eosinophils    NONE SEEN /HPF 











Assessment and Plan


Problem List:  


(1) Respiratory failure


ICD Codes:  J96.90 - Respiratory failure, unspecified, unspecified whether with 

hypoxia or hypercapnia


Status:  Acute


(2) Elevated troponin


ICD Codes:  R74.8 - Abnormal levels of other serum enzymes


(3) Sepsis


ICD Codes:  A41.9 - Sepsis, unspecified organism


Status:  Acute


(4) DKA (diabetic ketoacidoses)


ICD Codes:  E13.10 - Other specified diabetes mellitus with ketoacidosis 

without coma


Status:  Acute


(5) Acute metabolic encephalopathy


ICD Codes:  G93.41 - Metabolic encephalopathy


(6) Acute kidney injury


ICD Codes:  N17.9 - Acute kidney failure, unspecified


(7) ETOH abuse


ICD Codes:  F10.10 - Alcohol abuse, uncomplicated


Assessment and Plan


No new cardiac issues. Echo with nl LV fx. Rhythm stable. Possibly withdrawing 

from ETOH. Continue ICU care.





Problem Qualifiers





(1) Respiratory failure:  


Qualified Codes:  J96.00 - Acute respiratory failure, unspecified whether with 

hypoxia or hypercapnia


(2) Sepsis:  


Qualified Codes:  A41.9 - Sepsis, unspecified organism


(3) DKA (diabetic ketoacidoses):  


Qualified Codes:  E10.11 - Type 1 diabetes mellitus with ketoacidosis with coma








Silvia Baltazar MD Mar 20, 2018 15:22

## 2018-03-20 NOTE — HHI.CCPN
Subjective


Remarks/Hospital Course


62-year-old  male.  Date of admission 3/17/2018.  Past medical history 

includes diabetes according to friend per ER physician who is currently 

unavailable.  This individual presented to the emergency room by EMS after he 

was found unresponsive this morning by his friend in the hotel room.   When EMS 

arrived and checked his blood sugar the meter read high.  They brought him 

unresponsive with GCS of 3.  Vital signs were otherwise stable.  Patient was in 

no condition to give any meaningful history.  The bedside blood sugar in the 

emergency room read high as well.  There is no family or friend currently with 

the patient.  He was last seen normal last night. 





Patient received 20 mg etomidate and 100 mg succinylcholine was extubated with 

a 7.5 ET tube.  CT brain revealed no acute intracranial findings.  His white 

blood cell count is 41,000.  He has a macrocytosis on his lab draws ammonia 

levels 161.  Received  vancomycin and piperacillin/tazobactam in the emergency 

department.  He was started on lactulose 30 cc 4 times daily.  EKG revealed 

normal sinus rhythm 83.  Incomplete right bundle branch block.  Troponin is 

currently pending.  Creatinine was 2.4.  Potassium is 6.3.  Sodium was 127.  

Troponin 0 0.04.  TSH 1.19.  Patient received 10 mg insulin R and started on a 

drip currently 8 units an hour.  3 L normal saline wide open.  Currently normal 

saline at 250 cc now.  We are asked to admit.





Subjective





3/18: Afebrile.  Currently resting in bed in no acute distress on sedation.  

Decreased urine output.  Replacing phosphorus this a.m.  No bowel movement.  

Will start on tube feedings with Glucerna 1.5





3/19 Patient remains intubated on no sedation. Off Neosyn and insulin drips. 

Afebrile.


3/20 Patient remains intubated, on Fentanyl and Precedex drip . renal function 

is improving with Cr:1.75 today from 2.16.





Objective





Vital Signs








  Date Time  Temp Pulse Resp B/P (MAP) Pulse Ox O2 Delivery O2 Flow Rate FiO2


 


3/20/18 07:44     100   25


 


3/20/18 06:00  68      


 


3/20/18 04:00 98.1  15 109/73 (85)    





    107/56 (73)    


 


3/17/18 14:12      Ventilator  














Intake and Output   


 


 3/20/18 3/20/18 3/21/18





 08:00 16:00 00:00


 


Intake Total 1623 ml  


 


Output Total 1650 ml  


 


Balance -27 ml  








Result Diagram:  


3/20/18 0530                                                                   

             3/20/18 0530





Other Results





Laboratory Tests








Test


  3/19/18


08:55 3/19/18


09:40 3/19/18


10:50 3/19/18


14:15


 


Blood Gas Puncture Site ART LINE    


 


Blood Gas Patient Temperature 98.6    


 


Blood Gas HCO3 19 mmol/L    


 


Blood Gas Base Excess -4.5 mmol/L    


 


Blood Gas Oxygen Saturation 95 %    


 


Arterial Blood pH 7.40    


 


Arterial Blood Partial


Pressure CO2 32 mmHg 


  


  


  


 


 


Arterial Blood Partial


Pressure O2 95 mmHg 


  


  


  


 


 


Arterial Blood Oxygen Content 15.5 Vol %    


 


Arterial Blood


Carboxyhemoglobin 1.3 % 


  


  


  


 


 


Arterial Blood Methemoglobin 1.6 %    


 


Blood Gas Hemoglobin 11.5 G/DL    


 


Oxygen Delivery Device VENTILATOR    


 


Blood Gas Ventilator Setting


  CPAP/EPAP5/IPAP5


  


  


  


 


 


Blood Gas Inspired Oxygen 25 %    


 


Activated Partial


Thromboplast Time 


  42.3 SEC 


  


  


 


 


White Blood Count   6.2 TH/MM3  5.5 TH/MM3 


 


Red Blood Count   3.33 MIL/MM3  3.32 MIL/MM3 


 


Hemoglobin   11.1 GM/DL  11.0 GM/DL 


 


Hematocrit   31.2 %  30.9 % 


 


Mean Corpuscular Volume   93.6 FL  93.2 FL 


 


Mean Corpuscular Hemoglobin   33.2 PG  33.2 PG 


 


Mean Corpuscular Hemoglobin


Concent 


  


  35.5 % 


  35.6 % 


 


 


Red Cell Distribution Width   13.1 %  13.2 % 


 


Platelet Count   86 TH/MM3  81 TH/MM3 


 


Mean Platelet Volume   7.7 FL  7.0 FL 


 


Neutrophils (%) (Auto)   79.6 %  83.2 % 


 


Lymphocytes (%) (Auto)   13.0 %  10.0 % 


 


Monocytes (%) (Auto)   7.0 %  6.6 % 


 


Eosinophils (%) (Auto)   0.1 %  0.0 % 


 


Basophils (%) (Auto)   0.3 %  0.2 % 


 


Neutrophils # (Auto)   4.9 TH/MM3  4.6 TH/MM3 


 


Lymphocytes # (Auto)   0.8 TH/MM3  0.5 TH/MM3 


 


Monocytes # (Auto)   0.4 TH/MM3  0.4 TH/MM3 


 


Eosinophils # (Auto)   0.0 TH/MM3  0.0 TH/MM3 


 


Basophils # (Auto)   0.0 TH/MM3  0.0 TH/MM3 


 


CBC Comment   AUTO DIFF  AUTO DIFF 


 


Differential Comment


  


  


  AUTO DIFF


CONFIRMED AUTO DIFF


CONFIRMED


 


Platelet Estimate   LOW  LOW 


 


Platelet Morphology Comment   NORMAL  NORMAL 


 


Test


  3/19/18


17:54 3/19/18


21:00 3/20/18


05:30 


 


 


Activated Partial


Thromboplast Time 37.2 SEC 


  


  


  


 


 


Potassium Level  3.4 MEQ/L  3.8 MEQ/L  


 


Phosphorus Level  2.5 MG/DL  2.9 MG/DL  


 


White Blood Count   4.2 TH/MM3  


 


Red Blood Count   3.14 MIL/MM3  


 


Hemoglobin   10.5 GM/DL  


 


Hematocrit   29.5 %  


 


Mean Corpuscular Volume   94.0 FL  


 


Mean Corpuscular Hemoglobin   33.4 PG  


 


Mean Corpuscular Hemoglobin


Concent 


  


  35.6 % 


  


 


 


Red Cell Distribution Width   13.0 %  


 


Platelet Count   87 TH/MM3  


 


Mean Platelet Volume   7.5 FL  


 


Neutrophils (%) (Auto)   75.2 %  


 


Lymphocytes (%) (Auto)   16.8 %  


 


Monocytes (%) (Auto)   7.7 %  


 


Eosinophils (%) (Auto)   0.0 %  


 


Basophils (%) (Auto)   0.3 %  


 


Neutrophils # (Auto)   3.2 TH/MM3  


 


Lymphocytes # (Auto)   0.7 TH/MM3  


 


Monocytes # (Auto)   0.3 TH/MM3  


 


Eosinophils # (Auto)   0.0 TH/MM3  


 


Basophils # (Auto)   0.0 TH/MM3  


 


CBC Comment   AUTO DIFF  


 


Differential Comment


  


  


  AUTO DIFF


CONFIRMED 


 


 


Platelet Estimate   LOW  


 


Platelet Morphology Comment   NORMAL  


 


Blood Urea Nitrogen   26 MG/DL  


 


Creatinine   1.75 MG/DL  


 


Random Glucose   278 MG/DL  


 


Calcium Level   8.1 MG/DL  


 


Magnesium Level   2.2 MG/DL  


 


Sodium Level   147 MEQ/L  


 


Chloride Level   117 MEQ/L  


 


Carbon Dioxide Level   19.7 MEQ/L  


 


Anion Gap   10 MEQ/L  


 


Estimat Glomerular Filtration


Rate 


  


  40 ML/MIN 


  


 


 


Amylase Level   128 U/L  


 


Lipase   292 U/L  








Imaging





Last Impressions








Chest X-Ray 3/19/18 0600 Signed





Impressions: 





 Service Date/Time:  Monday, March 19, 2018 05:23 - CONCLUSION: The lungs are 





 clear.     Luca Mcgrath MD 


 


Abdomen/Pelvis CT 3/19/18 0000 Signed





Impressions: 





 Service Date/Time:  Monday, March 19, 2018 15:58 - CONCLUSION:  1. 

Unremarkable 





 unenhanced CT appearance of the pancreas. No gross peripancreatic inflammatory 





 change, pancreatic ductal dilatation or peripancreatic fluid collections. 

Please 





 note that CT examination is not sensitive for acute uncomplicated pancreatitis

, 





 particularly without IV contrast. 2. Trace bilateral pleural effusions with 





 minimal bibasilar consolidation, likely atelectasis. 3. Very trace free fluid 





 along the inferior margin of the liver. 4. Ancillary findings include colonic 





 diverticulosis, left renal cyst and degenerative spondylosis of the lumbar 





 spine.     Ye Mccall MD 


 


Head CT 3/17/18 1217 Signed





Impressions: 





 Service Date/Time:  Saturday, March 17, 2018 12:58 - CONCLUSION:  1. No acute 





 intracranial abnormality.     Benjamín Guerra MD 


 


Liver Ultrasound 3/17/18 0000 Signed





Impressions: 





 Service Date/Time:  Saturday, March 17, 2018 13:50 - CONCLUSION:  The liver is 





 normal in size and echogenicity. No evidence of mass. There are large but 





 benign-appearing left renal cyst present..     Kori Hernandez MD 








Objective Remarks


GENERAL: 62-year-old  male currently orotracheally intubated


SKIN: Warm and dry.  Face and upper extremity are sunburned


HEAD: Atraumatic. Normocephalic. 


EYES: Pupils equal and round about 4 mm bilaterally and reactive. No scleral 

icterus. No injection or drainage. 


ENT: No nasal bleeding or discharge.  Mucous membranes pink and moist.


NECK: Trachea midline. No JVD.  Left IJ is clean dry and intact


CARDIOVASCULAR: Regular rate and rhythm.  S1, S2.  No S4.  No murmur


RESPIRATORY: Tachypneic, using accessory muscles.  No wheezing appreciated.


GASTROINTESTINAL: Abdomen soft, non-tender, nondistended.  Hypoactive bowel 

sounds are appreciated


MUSCULOSKELETAL: Extremities without significant peripheral edema. No obvious 

deformities.  Right femoral arterial line is clean dry and intact


NEUROLOGICAL: Cranial nerves II through XII grossly intact.  Positive gag and 

corneal reflex.  Positive cough.  Withdraws to pain.  Moves all 4 extremities 

on sedation vacation but not following commands.


Date of Insertion:  Mar 17, 2018


Line:  Central Venous Catheter


Side:  Left


Location:  Internal, Jugular





A/P


Assessment and Plan


Neuro/Psych:





Acute toxic metabolic encephalopathy secondary hyperglycemia and elevated 

ammonia





Off sedation, monitor neuro status


CT brain 3/17 revealed no acute intracranial findings


Ammonia level is 161 on admission currently 51


Thiamine 100 mg IV daily, folate 1 mg daily and MVI daily


EEG showed mod encephalopathy, no epileptiform features.


 


CV: 





History of hypertension


Lactic acidosis


Elevated troponin likely type II non-STEMI due to demand ischemia





on Lopressor 25mg Q12 monitor HR and BP keep MAP>65mmHg


Lactate is currently 1.9 from 4.5 on arrival


Cards is following- Dr. Baltazar. Echo showed EF 55-60%


On ASA 81mg daily, off Heparin drip.





Resp: 





Acute hypoxemic respiratory failure





[facility]


Ventilator bundle


Albuterol/ipratropium aerosols every 4 hours with albuterol aerosols every 2 

hours needed for dyspnea


Spontaneous breathing trials as miriam- possible extubation today


CXR 3/19- clear lungs





GI: 





Hyperammonia


Elevated Lipase level. 








On Glucerna 1.5 goal 50 cc an hour


Famotidine 20 mg daily for GI prophylaxis


Docusate sodium/senna 1 tablet twice daily for bowel regimen


Lactulose 30 cc every 4 hours for elevated ammonia level.  


Liver ultrasound revealed no acute finding.  Hepatitis panel negative.


Monitor Lipase level- now within normal


CT abd/pelvis:. No gross peripancreatic inflammatory change, pancreatic ductal 

dilatation or peripancreatic fluid collections, colonic diverticulosis, left 

renal cyst.














Endo:  





Diabetic ketoacidosis- resolved


Hyperglycemia





On SSI medium scale and Levemir 10u BID





Renal: 


Acute kidney injury


Hypernatremia





Monitor renal function, I/O's, avoid nephrotoxins. Electrolytes replacement as 

needed


Renal function is improving with Cr:1.75 from 2.16, UOP:2450ml in 24 hrs


on 1/2 NS@84ml/hr





Heme:





Leukocytosis neutrophil predominant


Normocytic anemia





Monitor CBC, coags, follow up on Hep PLT ab. Off Heparin drip.








ID:





Cystitis





On piperacillin/tazobactam monitor for signs of infections ( Fever, WBC)


Received 1 dose of vancomycin ED





Blood cultures, Influenza screening, strep pneumonia and Legionella urinary Ag 

all negative





MSK:





PT evaluate and treat





Access


-Utilize peripheral IV. Left IJ CVP and right femoral Art line 3/17


Prophylaxis


-GI -famotidine


-DVT -SCD/off heparin drip.





Level 3











Michelle Castaneda MD Mar 20, 2018 08:39

## 2018-03-21 VITALS
DIASTOLIC BLOOD PRESSURE: 76 MMHG | OXYGEN SATURATION: 95 % | HEART RATE: 97 BPM | SYSTOLIC BLOOD PRESSURE: 163 MMHG | RESPIRATION RATE: 22 BRPM | TEMPERATURE: 98.7 F

## 2018-03-21 VITALS
HEART RATE: 96 BPM | TEMPERATURE: 98.5 F | RESPIRATION RATE: 22 BRPM | DIASTOLIC BLOOD PRESSURE: 97 MMHG | SYSTOLIC BLOOD PRESSURE: 176 MMHG | OXYGEN SATURATION: 99 %

## 2018-03-21 VITALS
DIASTOLIC BLOOD PRESSURE: 73 MMHG | TEMPERATURE: 98.3 F | HEART RATE: 95 BPM | SYSTOLIC BLOOD PRESSURE: 126 MMHG | OXYGEN SATURATION: 97 % | RESPIRATION RATE: 24 BRPM

## 2018-03-21 VITALS — HEART RATE: 87 BPM

## 2018-03-21 VITALS
HEART RATE: 97 BPM | DIASTOLIC BLOOD PRESSURE: 88 MMHG | RESPIRATION RATE: 21 BRPM | OXYGEN SATURATION: 96 % | TEMPERATURE: 98.9 F | SYSTOLIC BLOOD PRESSURE: 182 MMHG

## 2018-03-21 VITALS
TEMPERATURE: 98.9 F | HEART RATE: 97 BPM | RESPIRATION RATE: 18 BRPM | DIASTOLIC BLOOD PRESSURE: 79 MMHG | SYSTOLIC BLOOD PRESSURE: 152 MMHG | OXYGEN SATURATION: 98 %

## 2018-03-21 VITALS — HEART RATE: 105 BPM

## 2018-03-21 VITALS
RESPIRATION RATE: 17 BRPM | HEART RATE: 106 BPM | SYSTOLIC BLOOD PRESSURE: 146 MMHG | TEMPERATURE: 99.7 F | DIASTOLIC BLOOD PRESSURE: 73 MMHG | OXYGEN SATURATION: 99 %

## 2018-03-21 VITALS — OXYGEN SATURATION: 95 %

## 2018-03-21 VITALS — HEART RATE: 92 BPM

## 2018-03-21 LAB
BASOPHILS # BLD AUTO: 0 TH/MM3 (ref 0–0.2)
BASOPHILS NFR BLD: 0.3 % (ref 0–2)
BUN SERPL-MCNC: 21 MG/DL (ref 7–18)
CALCIUM SERPL-MCNC: 8.1 MG/DL (ref 8.5–10.1)
CHLORIDE SERPL-SCNC: 117 MEQ/L (ref 98–107)
CREAT SERPL-MCNC: 1.52 MG/DL (ref 0.6–1.3)
EOSINOPHIL # BLD: 0 TH/MM3 (ref 0–0.4)
EOSINOPHIL NFR BLD: 0.1 % (ref 0–4)
ERYTHROCYTE [DISTWIDTH] IN BLOOD BY AUTOMATED COUNT: 12.9 % (ref 11.6–17.2)
GFR SERPLBLD BASED ON 1.73 SQ M-ARVRAT: 47 ML/MIN (ref 89–?)
GLUCOSE SERPL-MCNC: 132 MG/DL (ref 74–106)
HCO3 BLD-SCNC: 23.1 MEQ/L (ref 21–32)
HCT VFR BLD CALC: 28.2 % (ref 39–51)
HGB BLD-MCNC: 10.1 GM/DL (ref 13–17)
LYMPHOCYTES # BLD AUTO: 0.6 TH/MM3 (ref 1–4.8)
LYMPHOCYTES NFR BLD AUTO: 11.5 % (ref 9–44)
MAGNESIUM SERPL-MCNC: 2.2 MG/DL (ref 1.5–2.5)
MCH RBC QN AUTO: 33.8 PG (ref 27–34)
MCHC RBC AUTO-ENTMCNC: 35.8 % (ref 32–36)
MCV RBC AUTO: 94.3 FL (ref 80–100)
MONOCYTE #: 0.5 TH/MM3 (ref 0–0.9)
MONOCYTES NFR BLD: 8.8 % (ref 0–8)
NEUTROPHILS # BLD AUTO: 4.4 TH/MM3 (ref 1.8–7.7)
NEUTROPHILS NFR BLD AUTO: 79.3 % (ref 16–70)
PHOSPHATE SERPL-MCNC: 1.7 MG/DL (ref 2.5–4.9)
PLATELET # BLD: 105 TH/MM3 (ref 150–450)
PMV BLD AUTO: 7.5 FL (ref 7–11)
RBC # BLD AUTO: 2.99 MIL/MM3 (ref 4.5–5.9)
SODIUM SERPL-SCNC: 149 MEQ/L (ref 136–145)
WBC # BLD AUTO: 5.6 TH/MM3 (ref 4–11)

## 2018-03-21 RX ADMIN — FOLIC ACID SCH MLS/HR: 5 INJECTION, SOLUTION INTRAMUSCULAR; INTRAVENOUS; SUBCUTANEOUS at 10:02

## 2018-03-21 RX ADMIN — WATER SCH ML: 1 IRRIGANT IRRIGATION at 04:09

## 2018-03-21 RX ADMIN — IPRATROPIUM BROMIDE AND ALBUTEROL SULFATE SCH AMPULE: .5; 3 SOLUTION RESPIRATORY (INHALATION) at 03:51

## 2018-03-21 RX ADMIN — CHLORHEXIDINE GLUCONATE SCH PACK: 500 CLOTH TOPICAL at 21:28

## 2018-03-21 RX ADMIN — HUMAN INSULIN SCH: 100 INJECTION, SOLUTION SUBCUTANEOUS at 11:57

## 2018-03-21 RX ADMIN — ACYCLOVIR SCH UNITS: 800 TABLET ORAL at 21:28

## 2018-03-21 RX ADMIN — TAZOBACTAM SODIUM AND PIPERACILLIN SODIUM SCH MLS/HR: 375; 3 INJECTION, SOLUTION INTRAVENOUS at 14:00

## 2018-03-21 RX ADMIN — HUMAN INSULIN SCH: 100 INJECTION, SOLUTION SUBCUTANEOUS at 16:00

## 2018-03-21 RX ADMIN — THIAMINE HYDROCHLORIDE SCH MLS/HR: 100 INJECTION, SOLUTION INTRAMUSCULAR; INTRAVENOUS at 09:45

## 2018-03-21 RX ADMIN — WATER SCH ML: 1 IRRIGANT IRRIGATION at 21:24

## 2018-03-21 RX ADMIN — STANDARDIZED SENNA CONCENTRATE AND DOCUSATE SODIUM SCH TAB: 8.6; 5 TABLET, FILM COATED ORAL at 09:00

## 2018-03-21 RX ADMIN — ASPIRIN 81 MG SCH MG: 81 TABLET ORAL at 08:00

## 2018-03-21 RX ADMIN — Medication SCH ML: at 21:29

## 2018-03-21 RX ADMIN — Medication SCH ML: at 09:00

## 2018-03-21 RX ADMIN — ACYCLOVIR SCH UNITS: 800 TABLET ORAL at 10:43

## 2018-03-21 RX ADMIN — WATER SCH ML: 1 IRRIGANT IRRIGATION at 09:00

## 2018-03-21 RX ADMIN — FAMOTIDINE SCH MG: 10 INJECTION, SOLUTION INTRAVENOUS at 10:02

## 2018-03-21 RX ADMIN — FAMOTIDINE SCH MG: 10 INJECTION, SOLUTION INTRAVENOUS at 21:27

## 2018-03-21 RX ADMIN — TAZOBACTAM SODIUM AND PIPERACILLIN SODIUM SCH MLS/HR: 375; 3 INJECTION, SOLUTION INTRAVENOUS at 01:57

## 2018-03-21 RX ADMIN — TAZOBACTAM SODIUM AND PIPERACILLIN SODIUM SCH MLS/HR: 375; 3 INJECTION, SOLUTION INTRAVENOUS at 21:30

## 2018-03-21 RX ADMIN — IPRATROPIUM BROMIDE AND ALBUTEROL SULFATE SCH AMPULE: .5; 3 SOLUTION RESPIRATORY (INHALATION) at 07:32

## 2018-03-21 RX ADMIN — WATER SCH ML: 1 IRRIGANT IRRIGATION at 00:20

## 2018-03-21 RX ADMIN — ACYCLOVIR SCH UNITS: 800 TABLET ORAL at 09:00

## 2018-03-21 RX ADMIN — METOPROLOL TARTRATE SCH MG: 25 TABLET, FILM COATED ORAL at 08:00

## 2018-03-21 RX ADMIN — ACYCLOVIR SCH TAB: 800 TABLET ORAL at 07:59

## 2018-03-21 RX ADMIN — HUMAN INSULIN SCH: 100 INJECTION, SOLUTION SUBCUTANEOUS at 00:19

## 2018-03-21 RX ADMIN — STANDARDIZED SENNA CONCENTRATE AND DOCUSATE SODIUM SCH TAB: 8.6; 5 TABLET, FILM COATED ORAL at 21:25

## 2018-03-21 RX ADMIN — ENOXAPARIN SODIUM SCH MG: 40 INJECTION SUBCUTANEOUS at 10:35

## 2018-03-21 RX ADMIN — CHLORHEXIDINE GLUCONATE 0.12% ORAL RINSE SCH ML: 1.2 LIQUID ORAL at 08:00

## 2018-03-21 RX ADMIN — HUMAN INSULIN SCH: 100 INJECTION, SOLUTION SUBCUTANEOUS at 20:00

## 2018-03-21 RX ADMIN — HUMAN INSULIN SCH: 100 INJECTION, SOLUTION SUBCUTANEOUS at 08:00

## 2018-03-21 RX ADMIN — CHLORHEXIDINE GLUCONATE 0.12% ORAL RINSE SCH ML: 1.2 LIQUID ORAL at 20:00

## 2018-03-21 RX ADMIN — TAZOBACTAM SODIUM AND PIPERACILLIN SODIUM SCH MLS/HR: 375; 3 INJECTION, SOLUTION INTRAVENOUS at 07:59

## 2018-03-21 RX ADMIN — CHLORHEXIDINE GLUCONATE SCH PACK: 500 CLOTH TOPICAL at 04:09

## 2018-03-21 RX ADMIN — HUMAN INSULIN SCH: 100 INJECTION, SOLUTION SUBCUTANEOUS at 04:00

## 2018-03-21 RX ADMIN — FOLIC ACID SCH MG: 1 TABLET ORAL at 08:00

## 2018-03-21 RX ADMIN — METOPROLOL TARTRATE SCH MG: 25 TABLET, FILM COATED ORAL at 21:25

## 2018-03-21 RX ADMIN — WATER SCH ML: 1 IRRIGANT IRRIGATION at 13:00

## 2018-03-21 RX ADMIN — WATER SCH ML: 1 IRRIGANT IRRIGATION at 16:09

## 2018-03-21 NOTE — HHI.CCPN
Subjective


Remarks/Hospital Course


62-year-old  male.  Date of admission 3/17/2018.  Past medical history 

includes diabetes according to friend per ER physician who is currently 

unavailable.  This individual presented to the emergency room by EMS after he 

was found unresponsive this morning by his friend in the hotel room.   When EMS 

arrived and checked his blood sugar the meter read high.  They brought him 

unresponsive with GCS of 3.  Vital signs were otherwise stable.  Patient was in 

no condition to give any meaningful history.  The bedside blood sugar in the 

emergency room read high as well.  There is no family or friend currently with 

the patient.  He was last seen normal last night. 





Patient received 20 mg etomidate and 100 mg succinylcholine was extubated with 

a 7.5 ET tube.  CT brain revealed no acute intracranial findings.  His white 

blood cell count is 41,000.  He has a macrocytosis on his lab draws ammonia 

levels 161.  Received  vancomycin and piperacillin/tazobactam in the emergency 

department.  He was started on lactulose 30 cc 4 times daily.  EKG revealed 

normal sinus rhythm 83.  Incomplete right bundle branch block.  Troponin is 

currently pending.  Creatinine was 2.4.  Potassium is 6.3.  Sodium was 127.  

Troponin 0 0.04.  TSH 1.19.  Patient received 10 mg insulin R and started on a 

drip currently 8 units an hour.  3 L normal saline wide open.  Currently normal 

saline at 250 cc now.  We are asked to admit.





Subjective





3/18: Afebrile.  Currently resting in bed in no acute distress on sedation.  

Decreased urine output.  Replacing phosphorus this a.m.  No bowel movement.  

Will start on tube feedings with Glucerna 1.5





3/19 Patient remains intubated on no sedation. Off Neosyn and insulin drips. 

Afebrile.


3/20 Patient remains intubated, on Fentanyl and Precedex drip . renal function 

is improving with Cr:1.75 today from 2.16.





3/21: Self extubated yesterday tolerating well.  Alert awake breathing 

comfortably creatinine continues to improve 1.5 today.  Urine output is adequate





Objective





Vital Signs








  Date Time  Temp Pulse Resp B/P (MAP) Pulse Ox O2 Delivery O2 Flow Rate FiO2


 


3/21/18 08:00 98.9       


 


3/21/18 08:00  97 21 182/88 (119) 96   


 


3/21/18 07:42        21


 


3/20/18 19:55      Nasal Cannula 2.00 














Intake and Output   


 


 3/21/18 3/21/18 3/22/18





 08:00 16:00 00:00


 


Intake Total 530 ml  


 


Output Total 1075 ml  


 


Balance -545 ml  








Result Diagram:  


3/21/18 0435                                                                   

             3/21/18 0435





Imaging





Last Impressions








Chest X-Ray 3/19/18 0600 Signed





Impressions: 





 Service Date/Time:  Monday, March 19, 2018 05:23 - CONCLUSION: The lungs are 





 clear.     Luca Mcgrath MD 


 


Abdomen/Pelvis CT 3/19/18 0000 Signed





Impressions: 





 Service Date/Time:  Monday, March 19, 2018 15:58 - CONCLUSION:  1. 

Unremarkable 





 unenhanced CT appearance of the pancreas. No gross peripancreatic inflammatory 





 change, pancreatic ductal dilatation or peripancreatic fluid collections. 

Please 





 note that CT examination is not sensitive for acute uncomplicated pancreatitis

, 





 particularly without IV contrast. 2. Trace bilateral pleural effusions with 





 minimal bibasilar consolidation, likely atelectasis. 3. Very trace free fluid 





 along the inferior margin of the liver. 4. Ancillary findings include colonic 





 diverticulosis, left renal cyst and degenerative spondylosis of the lumbar 





 spine.     Ye Mccall MD 


 


Head CT 3/17/18 1217 Signed





Impressions: 





 Service Date/Time:  Saturday, March 17, 2018 12:58 - CONCLUSION:  1. No acute 





 intracranial abnormality.     Benjamín Guerra MD 


 


Liver Ultrasound 3/17/18 0000 Signed





Impressions: 





 Service Date/Time:  Saturday, March 17, 2018 13:50 - CONCLUSION:  The liver is 





 normal in size and echogenicity. No evidence of mass. There are large but 





 benign-appearing left renal cyst present..     Kori Hernandez MD 








Objective Remarks


GENERAL: 62-year-old  male currently lying in bed on NC


SKIN: Warm and dry.  Face and upper extremity are sunburned


HEAD: Atraumatic. Normocephalic. 


EYES: Pupils equal and round about 4 mm bilaterally and reactive. No scleral 

icterus. No injection or drainage. 


ENT: No nasal bleeding or discharge.  Mucous membranes dry


NECK: Trachea midline. No JVD.  Left IJ is clean dry and intact


CARDIOVASCULAR: Regular rate and rhythm.  S1, S2.  No S4.  No murmur


RESPIRATORY: Breathing comfortably no wheezes or crackles


GASTROINTESTINAL: Abdomen soft, non-tender, nondistended.  Hypoactive bowel 

sounds are appreciated


MUSCULOSKELETAL: Extremities without significant peripheral edema. 


NEUROLOGICAL: Alert awake.  Moves all extremities follows commands no focal 

deficit


Date of Insertion:  Mar 17, 2018


Line:  Central Venous Catheter


Side:  Left


Location:  Internal, Jugular





A/P


Assessment and Plan


Neuro/Psych:





Acute toxic metabolic encephalopathy secondary hyperglycemia and elevated 

ammonia





Off sedation, monitor neuro status


CT brain 3/17 revealed no acute intracranial findings


Ammonia level is 161 on admission currently 51


Thiamine 100 mg IV daily, folate 1 mg daily and MVI daily


EEG showed mod encephalopathy, no epileptiform features.


Watch for alcohol withdrawal. Use Ativan PRN





CV: 





History of hypertension


Lactic acidosis-resolved


Elevated troponin likely type II non-STEMI due to demand ischemia





on Lopressor 25mg Q12 monitor HR and BP keep MAP>65mmHg


Lactate is currently 1.9 from 4.5 on arrival


Cards is following- Dr. Baltazar. Echo showed EF 55-60%


On ASA 81mg daily, off Heparin drip.





Resp: 





Acute hypoxemic respiratory failure





Albuterol/ipratropium aerosols every 2 hours needed for dyspnea


Prior to planned extubation patient self extubated on 3/20/2018


CXR 3/19- clear lungs





GI: 





Hyperammonemia


Elevated Lipase level. 





Bedside swallow eval when 1800 ADA diet


Famotidine 20 mg daily for GI prophylaxis


Docusate sodium/senna 1 tablet twice daily for bowel regimen


Lactulose 30 cc every 4 hours for elevated ammonia level.  


Liver ultrasound revealed no acute finding.  Hepatitis panel negative.


Monitor Lipase level- now within normal


CT abd/pelvis:. No gross peripancreatic inflammatory change, pancreatic ductal 

dilatation or peripancreatic fluid collections, colonic diverticulosis, left 

renal cyst.





Endo:  





Diabetic ketoacidosis- resolved


Hyperglycemia


Hypokalemia/hypophosphatemia





On SSI medium scale and Levemir 10u BID


Electrolyte replacement per protocol





Renal: 


Acute kidney injury


Hypernatremia





Monitor renal function, I/O's, avoid nephrotoxins. Electrolytes replacement as 

needed


Renal function is improving with Cr: 1.5 today


on 1/2 NS@84ml/hr





Heme:





Leukocytosis neutrophil predominant


Normocytic anemia





Monitor CBC, coags, follow up on Hep PLT ab. Off Heparin drip.








ID:





Cystitis





On piperacillin/tazobactam monitor for signs of infections ( Fever, WBC)


Received 1 dose of vancomycin ED


Sputum --beta strep


Blood cultures Influenza screening, strep pneumonia and Legionella urinary Ag 

all negative





MSK:





PT evaluate and treat, OOB





Access


-Utilize peripheral IV. Left IJ CVP and right femoral Art line 3/17-DC both


Prophylaxis


-GI -famotidine


-DVT -SCD..  Screen is negative start Lovenox 40 mg subcu daily


Level 2





Consult [facility] to assume care in am. Transfer to [facility] with Tele











Kelvin Gilliland MD Mar 21, 2018 09:53

## 2018-03-21 NOTE — PD.CARD.PN
Subjective


Subjective Remarks


Extubated, denies CP or SOB, wants to go home





Objective


Medications





Current Medications








 Medications


  (Trade)  Dose


 Ordered  Sig/Rogelio


 Route  Start Time


 Stop Time Status Last Admin


 


 Sodium Phosphate


 15 mmol/Sodium


 Chloride  105 ml @ 


 25 mls/hr  UNSCH  PRN


 IV  3/17/18 12:30


     


 


 


  (NS Flush)  2 ml  UNSCH  PRN


 IV FLUSH  3/17/18 13:15


     


 


 


  (NS Flush)  2 ml  BID


 IV FLUSH  3/17/18 21:00


    3/20/18 20:50


 


 


  (Tears Naturale


 Opth Soln)  1 drop  TID


 EACH EYE  3/17/18 18:00


    3/19/18 17:19


 


 


  (Zofran Inj)  4 mg  Q6H  PRN


 IV PUSH  3/17/18 13:15


     


 


 


  (Albuterol Neb)  2.5 mg  Q2HR NEB  PRN


 INH  3/17/18 13:15


     


 


 


 Miscellaneous


 Information  1  Q361D


 XX  3/17/18 13:15


    3/17/18 20:53


 


 


  (Chlorhexidine


 2% Cloth)  3 pack


 Taper  DAILY@04


 TOP  3/18/18 04:00


 3/14/19 03:59  3/21/18 04:09


 


 


  (Chlorhexidine


 2% Cloth)  3 pack  UNSCH  PRN


 TOP  3/17/18 13:15


     


 


 


  (Sully-Colace)  1 tab  BID


 PO  3/17/18 21:00


    3/20/18 20:48


 


 


  (Milk Of


 Magnesia Liq)  30 ml  Q12H  PRN


 PO  3/17/18 13:15


     


 


 


  (Senokot)  17.2 mg  Q12H  PRN


 PO  3/17/18 13:15


     


 


 


  (Dulcolax Supp)  10 mg  DAILY  PRN


 RECTAL  3/17/18 13:15


     


 


 


  (Lactulose Liq)  30 ml  DAILY  PRN


 PO  3/17/18 13:15


    3/17/18 20:48


 


 


  (Peridex 0.12%


 Liq)  15 ml  BID@08,20


 MT  3/17/18 20:00


    3/19/18 20:23


 


 


 Thiamine HCl 100


 mg/Sodium Chloride  101 ml @ 


 101 mls/hr  DAILY


 IV  3/18/18 09:00


    3/21/18 10:02


 


 


  (Folate)  1 mg  DAILY


 PO  3/18/18 09:00


    3/21/18 08:00


 


 


  (Theragran)  1 tab  DAILY


 PO  3/18/18 09:00


    3/21/18 07:59


 


 


  (Brethine Inj)  1 mg  UNSCH  PRN


 SQ  3/17/18 16:30


     


 


 


  (NS Flush)    DAILY


 IV FLUSH  3/17/18 17:00


    3/19/18 09:00


 


 


  (NS Flush)    UNSCH  PRN


 IV FLUSH  3/17/18 17:00


     


 


 


  (Aspirin Chew)  81 mg  DAILY


 CHEW  3/19/18 09:00


    3/21/18 08:00


 


 


  (Levemir Inj)  10 units  BID


 SQ  3/19/18 21:00


    3/20/18 20:49


 


 


  (Lactulose Liq)  30 ml  Q4H


 PO  3/19/18 13:00


    3/21/18 16:09


 


 


  (D50w (Vial) Inj)  50 ml  UNSCH  PRN


 IV PUSH  3/19/18 09:30


     


 


 


  (Glucagon Inj)  1 mg  UNSCH  PRN


 OTHER  3/19/18 09:30


     


 


 


  (NovoLIN R


 SUPPLEMENTAL


 SCALE)  1  Q4HR


 SQ  3/19/18 09:30


    3/21/18 16:00


 


 


  (Lopressor)  25 mg  Q12HR


 PO  3/19/18 10:00


    3/21/18 08:00


 


 


  (Apresoline Inj)  10 mg  Q6H  PRN


 IV PUSH  3/19/18 10:00


    3/21/18 10:42


 


 


 Piperacillin Sod/


 Tazobactam Sod  50 ml @ 


 100 mls/hr  Q6H


 IV  3/20/18 14:00


    3/21/18 14:00


 


 


  (Pepcid Inj)  10 mg  Q12HR


 IV PUSH  3/20/18 21:00


    3/21/18 10:02


 


 


  (Ativan Inj)  1 mg  Q2H  PRN


 IV PUSH  3/21/18 09:30


     


 


 


  (Lovenox Inj)  40 mg  Q24H


 SQ  3/21/18 11:00


     


 








Vital Signs / I&O





Vital Signs








  Date Time  Temp Pulse Resp B/P (MAP) Pulse Ox O2 Delivery O2 Flow Rate FiO2


 


3/21/18 18:00 98.5 96 22 176/97 (123) 99   


 


3/21/18 16:00 98.3 95 24 126/73 (90) 97   


 


3/21/18 16:00  95      


 


3/21/18 12:00 98.7 96 22 163/76 (105) 95   


 


3/21/18 12:00  97      


 


3/21/18 08:00 98.9       


 


3/21/18 08:00 98.9 97 21 182/88 (119) 96   


 


3/21/18 08:00  97      


 


3/21/18 07:42     95   21


 


3/21/18 06:00  92      


 


3/21/18 04:00  97      


 


3/21/18 04:00 98.9 97 18 152/79 (103) 98   


 


3/21/18 02:00  105      


 


3/21/18 00:00 99.7 106 17 146/73 (97) 99   


 


3/21/18 00:00  106      


 


3/20/18 22:00  94      


 


3/20/18 20:00 98.3 82 15 163/91 (115) 95   





    Arterial Line    


 


3/20/18 20:00  82      


 


3/20/18 19:55     100 Nasal Cannula 2.00 














I/O      


 


 3/20/18 3/20/18 3/20/18 3/21/18 3/21/18 3/21/18





 07:00 15:00 23:00 07:00 15:00 23:00


 


Intake Total 1623 ml 883.3 ml 2300 ml 530 ml 100 ml 480 ml


 


Output Total 1650 ml  1400 ml 1075 ml  600 ml


 


Balance -27 ml 883.3 ml 900 ml -545 ml 100 ml -120 ml


 


      


 


Intake Oral   30 ml 480 ml  480 ml


 


IV Total 1150 ml 883.3 ml 2058 ml 50 ml 100 ml 


 


Tube Feeding 373 ml  212 ml   


 


Other 100 ml     


 


Output Urine Total 1150 ml  1000 ml 725 ml  600 ml


 


Stool Total 500 ml  400 ml 350 ml  


 


# Voids    4  2


 


# Bowel Movements    3  








Physical Exam


GENERAL: In NAD


SKIN: Warm and dry.


HEAD: Normocephalic.


EYES: No scleral icterus. No injection or drainage. 


NECK: Supple, trachea midline. No JVD or lymphadenopathy.


CARDIOVASCULAR: Regular rate and rhythm without murmurs, gallops, or rubs. 


RESPIRATORY: Breath sounds equal bilaterally. No accessory muscle use.


GASTROINTESTINAL: Abdomen soft, non-tender, nondistended. 


MUSCULOSKELETAL: No cyanosis, or edema.





Laboratory





Laboratory Tests








Test


  3/21/18


04:35


 


White Blood Count 5.6 TH/MM3 


 


Red Blood Count 2.99 MIL/MM3 


 


Hemoglobin 10.1 GM/DL 


 


Hematocrit 28.2 % 


 


Mean Corpuscular Volume 94.3 FL 


 


Mean Corpuscular Hemoglobin 33.8 PG 


 


Mean Corpuscular Hemoglobin


Concent 35.8 % 


 


 


Red Cell Distribution Width 12.9 % 


 


Platelet Count 105 TH/MM3 


 


Mean Platelet Volume 7.5 FL 


 


Neutrophils (%) (Auto) 79.3 % 


 


Lymphocytes (%) (Auto) 11.5 % 


 


Monocytes (%) (Auto) 8.8 % 


 


Eosinophils (%) (Auto) 0.1 % 


 


Basophils (%) (Auto) 0.3 % 


 


Neutrophils # (Auto) 4.4 TH/MM3 


 


Lymphocytes # (Auto) 0.6 TH/MM3 


 


Monocytes # (Auto) 0.5 TH/MM3 


 


Eosinophils # (Auto) 0.0 TH/MM3 


 


Basophils # (Auto) 0.0 TH/MM3 


 


CBC Comment DIFF FINAL 


 


Differential Comment  


 


Blood Urea Nitrogen 21 MG/DL 


 


Creatinine 1.52 MG/DL 


 


Random Glucose 132 MG/DL 


 


Calcium Level 8.1 MG/DL 


 


Phosphorus Level 1.7 MG/DL 


 


Magnesium Level 2.2 MG/DL 


 


Sodium Level 149 MEQ/L 


 


Potassium Level 3.2 MEQ/L 


 


Chloride Level 117 MEQ/L 


 


Carbon Dioxide Level 23.1 MEQ/L 


 


Anion Gap 9 MEQ/L 


 


Estimat Glomerular Filtration


Rate 47 ML/MIN 


 











Assessment and Plan


Problem List:  


(1) Respiratory failure


ICD Codes:  J96.90 - Respiratory failure, unspecified, unspecified whether with 

hypoxia or hypercapnia


Status:  Acute


(2) Elevated troponin


ICD Codes:  R74.8 - Abnormal levels of other serum enzymes


(3) Sepsis


ICD Codes:  A41.9 - Sepsis, unspecified organism


Status:  Acute


(4) DKA (diabetic ketoacidoses)


ICD Codes:  E13.10 - Other specified diabetes mellitus with ketoacidosis 

without coma


Status:  Acute


(5) Acute metabolic encephalopathy


ICD Codes:  G93.41 - Metabolic encephalopathy


(6) Acute kidney injury


ICD Codes:  N17.9 - Acute kidney failure, unspecified


(7) ETOH abuse


ICD Codes:  F10.10 - Alcohol abuse, uncomplicated


Assessment and Plan


No new cardiac issues, successfully extubated and stable. Echo with nl LV fx. 

Rhythm stable. Increase activity, PT. Counseled to quit drinking ETOH. D/w pt 

and mother.





Problem Qualifiers





(1) Respiratory failure:  


Qualified Codes:  J96.00 - Acute respiratory failure, unspecified whether with 

hypoxia or hypercapnia


(2) Sepsis:  


Qualified Codes:  A41.9 - Sepsis, unspecified organism


(3) DKA (diabetic ketoacidoses):  


Qualified Codes:  E10.11 - Type 1 diabetes mellitus with ketoacidosis with coma








Silvia Baltazar MD Mar 21, 2018 18:51

## 2018-03-22 VITALS
HEART RATE: 84 BPM | OXYGEN SATURATION: 96 % | DIASTOLIC BLOOD PRESSURE: 85 MMHG | TEMPERATURE: 98.7 F | SYSTOLIC BLOOD PRESSURE: 173 MMHG | RESPIRATION RATE: 18 BRPM

## 2018-03-22 VITALS
SYSTOLIC BLOOD PRESSURE: 168 MMHG | TEMPERATURE: 98.3 F | OXYGEN SATURATION: 100 % | RESPIRATION RATE: 18 BRPM | HEART RATE: 83 BPM | DIASTOLIC BLOOD PRESSURE: 87 MMHG

## 2018-03-22 VITALS
TEMPERATURE: 98.8 F | DIASTOLIC BLOOD PRESSURE: 94 MMHG | HEART RATE: 88 BPM | OXYGEN SATURATION: 96 % | RESPIRATION RATE: 20 BRPM | SYSTOLIC BLOOD PRESSURE: 176 MMHG

## 2018-03-22 VITALS
OXYGEN SATURATION: 95 % | TEMPERATURE: 98.5 F | HEART RATE: 82 BPM | RESPIRATION RATE: 20 BRPM | SYSTOLIC BLOOD PRESSURE: 165 MMHG | DIASTOLIC BLOOD PRESSURE: 89 MMHG

## 2018-03-22 VITALS
TEMPERATURE: 98.6 F | DIASTOLIC BLOOD PRESSURE: 104 MMHG | SYSTOLIC BLOOD PRESSURE: 184 MMHG | RESPIRATION RATE: 18 BRPM | OXYGEN SATURATION: 97 % | HEART RATE: 81 BPM

## 2018-03-22 VITALS — HEART RATE: 82 BPM

## 2018-03-22 LAB
ALBUMIN SERPL-MCNC: 2.6 GM/DL (ref 3.4–5)
ALP SERPL-CCNC: 171 U/L (ref 45–117)
ALT SERPL-CCNC: 80 U/L (ref 12–78)
AST SERPL-CCNC: 66 U/L (ref 15–37)
BASOPHILS # BLD AUTO: 0 TH/MM3 (ref 0–0.2)
BASOPHILS NFR BLD: 0.5 % (ref 0–2)
BILIRUB SERPL-MCNC: 0.7 MG/DL (ref 0.2–1)
BUN SERPL-MCNC: 19 MG/DL (ref 7–18)
CALCIUM SERPL-MCNC: 8.3 MG/DL (ref 8.5–10.1)
CHLORIDE SERPL-SCNC: 111 MEQ/L (ref 98–107)
CREAT SERPL-MCNC: 1.33 MG/DL (ref 0.6–1.3)
EOSINOPHIL # BLD: 0.1 TH/MM3 (ref 0–0.4)
EOSINOPHIL NFR BLD: 0.6 % (ref 0–4)
ERYTHROCYTE [DISTWIDTH] IN BLOOD BY AUTOMATED COUNT: 13.1 % (ref 11.6–17.2)
GFR SERPLBLD BASED ON 1.73 SQ M-ARVRAT: 55 ML/MIN (ref 89–?)
GLUCOSE SERPL-MCNC: 233 MG/DL (ref 74–106)
HCO3 BLD-SCNC: 21.2 MEQ/L (ref 21–32)
HCT VFR BLD CALC: 34.9 % (ref 39–51)
HGB BLD-MCNC: 12.1 GM/DL (ref 13–17)
LYMPHOCYTES # BLD AUTO: 1 TH/MM3 (ref 1–4.8)
LYMPHOCYTES NFR BLD AUTO: 11.3 % (ref 9–44)
MAGNESIUM SERPL-MCNC: 2.3 MG/DL (ref 1.5–2.5)
MCH RBC QN AUTO: 32.9 PG (ref 27–34)
MCHC RBC AUTO-ENTMCNC: 34.6 % (ref 32–36)
MCV RBC AUTO: 95.1 FL (ref 80–100)
MONOCYTE #: 1.1 TH/MM3 (ref 0–0.9)
MONOCYTES NFR BLD: 12.3 % (ref 0–8)
NEUTROPHILS # BLD AUTO: 6.7 TH/MM3 (ref 1.8–7.7)
NEUTROPHILS NFR BLD AUTO: 75.3 % (ref 16–70)
PHOSPHATE SERPL-MCNC: 3.4 MG/DL (ref 2.5–4.9)
PLATELET # BLD: 141 TH/MM3 (ref 150–450)
PMV BLD AUTO: 8.9 FL (ref 7–11)
PROT SERPL-MCNC: 6.2 GM/DL (ref 6.4–8.2)
RBC # BLD AUTO: 3.67 MIL/MM3 (ref 4.5–5.9)
SODIUM SERPL-SCNC: 144 MEQ/L (ref 136–145)
WBC # BLD AUTO: 8.9 TH/MM3 (ref 4–11)

## 2018-03-22 RX ADMIN — TAZOBACTAM SODIUM AND PIPERACILLIN SODIUM SCH MLS/HR: 375; 3 INJECTION, SOLUTION INTRAVENOUS at 01:55

## 2018-03-22 RX ADMIN — HUMAN INSULIN SCH: 100 INJECTION, SOLUTION SUBCUTANEOUS at 20:00

## 2018-03-22 RX ADMIN — TAZOBACTAM SODIUM AND PIPERACILLIN SODIUM SCH MLS/HR: 375; 3 INJECTION, SOLUTION INTRAVENOUS at 10:28

## 2018-03-22 RX ADMIN — Medication SCH ML: at 09:00

## 2018-03-22 RX ADMIN — HUMAN INSULIN SCH UNITS: 100 INJECTION, SOLUTION SUBCUTANEOUS at 16:13

## 2018-03-22 RX ADMIN — ACYCLOVIR SCH UNITS: 800 TABLET ORAL at 22:19

## 2018-03-22 RX ADMIN — LEVOFLOXACIN SCH MG: 500 TABLET, FILM COATED ORAL at 16:12

## 2018-03-22 RX ADMIN — STANDARDIZED SENNA CONCENTRATE AND DOCUSATE SODIUM SCH TAB: 8.6; 5 TABLET, FILM COATED ORAL at 21:00

## 2018-03-22 RX ADMIN — METOPROLOL TARTRATE SCH MG: 25 TABLET, FILM COATED ORAL at 10:29

## 2018-03-22 RX ADMIN — HUMAN INSULIN SCH: 100 INJECTION, SOLUTION SUBCUTANEOUS at 12:00

## 2018-03-22 RX ADMIN — ASPIRIN 81 MG SCH MG: 81 TABLET ORAL at 10:31

## 2018-03-22 RX ADMIN — WATER SCH ML: 1 IRRIGANT IRRIGATION at 09:00

## 2018-03-22 RX ADMIN — STANDARDIZED SENNA CONCENTRATE AND DOCUSATE SODIUM SCH TAB: 8.6; 5 TABLET, FILM COATED ORAL at 10:29

## 2018-03-22 RX ADMIN — FAMOTIDINE SCH MG: 10 INJECTION, SOLUTION INTRAVENOUS at 10:29

## 2018-03-22 RX ADMIN — Medication SCH ML: at 21:00

## 2018-03-22 RX ADMIN — CHLORHEXIDINE GLUCONATE 0.12% ORAL RINSE SCH ML: 1.2 LIQUID ORAL at 08:00

## 2018-03-22 RX ADMIN — HUMAN INSULIN SCH: 100 INJECTION, SOLUTION SUBCUTANEOUS at 00:12

## 2018-03-22 RX ADMIN — ACYCLOVIR SCH UNITS: 800 TABLET ORAL at 10:30

## 2018-03-22 RX ADMIN — ENOXAPARIN SODIUM SCH MG: 40 INJECTION SUBCUTANEOUS at 10:30

## 2018-03-22 RX ADMIN — METOPROLOL TARTRATE SCH MG: 25 TABLET, FILM COATED ORAL at 22:20

## 2018-03-22 RX ADMIN — WATER SCH ML: 1 IRRIGANT IRRIGATION at 03:17

## 2018-03-22 RX ADMIN — HUMAN INSULIN SCH: 100 INJECTION, SOLUTION SUBCUTANEOUS at 17:35

## 2018-03-22 RX ADMIN — ACYCLOVIR SCH TAB: 800 TABLET ORAL at 10:29

## 2018-03-22 RX ADMIN — FOLIC ACID SCH MG: 1 TABLET ORAL at 10:29

## 2018-03-22 RX ADMIN — WATER SCH ML: 1 IRRIGANT IRRIGATION at 00:12

## 2018-03-22 RX ADMIN — NIFEDIPINE SCH MG: 30 TABLET, FILM COATED, EXTENDED RELEASE ORAL at 17:36

## 2018-03-22 RX ADMIN — HUMAN INSULIN SCH: 100 INJECTION, SOLUTION SUBCUTANEOUS at 03:21

## 2018-03-22 RX ADMIN — WATER SCH ML: 1 IRRIGANT IRRIGATION at 11:05

## 2018-03-22 RX ADMIN — THIAMINE HYDROCHLORIDE SCH MLS/HR: 100 INJECTION, SOLUTION INTRAMUSCULAR; INTRAVENOUS at 16:11

## 2018-03-22 NOTE — HHI.PR
Subjective


Remarks


awake and alert no complains


seen with family at bedside





known diabetic- insulin requiring on Lantus and Novolog


admits to non compliance


admits to alcohol use





Objective


Vitals





Vital Signs








  Date Time  Temp Pulse Resp B/P (MAP) Pulse Ox O2 Delivery O2 Flow Rate FiO2


 


3/22/18 12:00 98.3 83 18  100   


 


3/22/18 08:00 98.6 81 18 184/104 (130) 97   


 


3/22/18 04:00 98.5 82 20 165/89 (114) 95   


 


3/22/18 00:00 98.8 88 20 176/94 (121) 96   


 


3/21/18 20:00  87      


 


3/21/18 18:00 98.5 96 22 176/97 (123) 99   


 


3/21/18 16:00 98.3 95 24 126/73 (90) 97   


 


3/21/18 16:00  95      














I/O      


 


 3/21/18 3/21/18 3/21/18 3/22/18 3/22/18 3/22/18





 07:00 15:00 23:00 07:00 15:00 23:00


 


Intake Total 530 ml 100 ml 480 ml   


 


Output Total 1075 ml  600 ml 1400 ml  


 


Balance -545 ml 100 ml -120 ml -1400 ml  


 


      


 


Intake Oral 480 ml  480 ml   


 


IV Total 50 ml 100 ml    


 


Output Urine Total 725 ml  600 ml 1400 ml  


 


Stool Total 350 ml     


 


# Voids 4  2   


 


# Bowel Movements 3   2  








Result Diagram:  


3/22/18 0700                                                                   

             3/22/18 0700





Imaging





Last Impressions








Chest X-Ray 3/19/18 0600 Signed





Impressions: 





 Service Date/Time:  Monday, March 19, 2018 05:23 - CONCLUSION: The lungs are 





 clear.     Luca Mcgrath MD 


 


Abdomen/Pelvis CT 3/19/18 0000 Signed





Impressions: 





 Service Date/Time:  Monday, March 19, 2018 15:58 - CONCLUSION:  1. 

Unremarkable 





 unenhanced CT appearance of the pancreas. No gross peripancreatic inflammatory 





 change, pancreatic ductal dilatation or peripancreatic fluid collections. 

Please 





 note that CT examination is not sensitive for acute uncomplicated pancreatitis

, 





 particularly without IV contrast. 2. Trace bilateral pleural effusions with 





 minimal bibasilar consolidation, likely atelectasis. 3. Very trace free fluid 





 along the inferior margin of the liver. 4. Ancillary findings include colonic 





 diverticulosis, left renal cyst and degenerative spondylosis of the lumbar 





 spine.     Ye Mccall MD 


 


Head CT 3/17/18 1217 Signed





Impressions: 





 Service Date/Time:  Saturday, March 17, 2018 12:58 - CONCLUSION:  1. No acute 





 intracranial abnormality.     Benjamín Guerra MD 


 


Liver Ultrasound 3/17/18 0000 Signed





Impressions: 





 Service Date/Time:  Saturday, March 17, 2018 13:50 - CONCLUSION:  The liver is 





 normal in size and echogenicity. No evidence of mass. There are large but 





 benign-appearing left renal cyst present..     Kori Hernandez MD 








Objective Remarks


awake and alert  oriented x 3 anicteric


lungs clear


regular rhythm


abdomensoft, nontender


extremities no edema- good peripheral pulses


neuro exam -unremarkable


Date of Insertion:  Mar 17, 2018


Line:  Central Venous Catheter


Side:  Left


Location:  Internal, Jugular





A/P


Problem List:  


(1) Acute respiratory failure


ICD Code:  J96.00 - Acute respiratory failure, unspecified whether with hypoxia 

or hypercapnia


(2) Thrombocytosis


ICD Code:  D47.3 - Essential (hemorrhagic) thrombocythemia


(3) Macrocytosis


ICD Code:  D75.89 - Other specified diseases of blood and blood-forming organs


(4) Increased ammonia level


ICD Code:  R79.89 - Other specified abnormal findings of blood chemistry


(5) Leukocytosis


ICD Code:  D72.829 - Elevated white blood cell count, unspecified


(6) Glycosuria


ICD Code:  R81 - Glycosuria


(7) Acute metabolic encephalopathy


ICD Code:  G93.41 - Metabolic encephalopathy


(8) Cystitis


ICD Code:  N30.90 - Cystitis, unspecified without hematuria


(9) Acute kidney injury


ICD Code:  N17.9 - Acute kidney failure, unspecified


(10) Hyperkalemia


ICD Code:  E87.5 - Hyperkalemia


(11) Hyponatremia


ICD Code:  E87.1 - Hypo-osmolality and hyponatremia


(12) Hyperglycemia due to type 2 diabetes mellitus


ICD Code:  E11.65 - Type 2 diabetes mellitus with hyperglycemia


Assessment and Plan


Metabolic encephalopathy secondary to DKA- MS improved


very non compliant with diet 


- reinforced diabetes education and compliance


- increase levemir to 15 units bid with Novolog 8 units tid





History of hypertension- some elevated readings


Elevated troponin likely type II non-STEMI due to demand ischemia





Lopressor 25mg Q12 monitor HR and BP keep MAP>65mmHg


Cards is following- Dr. Baltazar. Echo showed EF 55-60%


On ASA 81mg daily, 


ADd CCB- procardia 30 mg XL for beter control


may eventually benefit from ACE- not known with recent CHAU








S/P Acute hypoxemic respiratory failure- resolved


Increase ambulation








Hyperammonemia


Elevated Lipase level. 





improved


DC Lactulose.  


Liver ultrasound revealed no acute finding.  Hepatitis panel negative.


Monitor Lipase level- now within normal


CT abd/pelvis:. No gross peripancreatic inflammatory change, pancreatic ductal 

dilatation or peripancreatic fluid collections, colonic diverticulosis, left 

renal cyst.








Hypokalemia/hypophosphatemia





Electrolyte replacement per protocol





Acute kidney injury


Hypernatremia





Monitor renal function, I/O's, avoid nephrotoxins. Electrolytes replacement as 

needed


Renal function is improving with Cr: 1.5 today


on 1/2 NS@84ml/hr


continue fluids











Leukocytosis neutrophil predominant


Normocytic anemia


Cystitis


some reactive leukocytosis from stress


On piperacillin/tazobactam monitor for signs of infections ( Fever, WBC)


Received 1 dose of vancomycin ED


Sputum --beta strep


Blood cultures Influenza screening, strep pneumonia and Legionella urinary Ag 

all negative


change to po Levaquin 





chronic alcohol use


- Librium 25 mg po tid











-GI -famotidine


-DVT -SCD..  Screen is negative start Lovenox 40 mg subcu daily








plan for DC tomorrow- going back to [place]





Problem Qualifiers





(1) Acute respiratory failure:  


Qualified Codes:  J96.00 - Acute respiratory failure, unspecified whether with 

hypoxia or hypercapnia


(2) Leukocytosis:  


Qualified Codes:  D72.828 - Other elevated white blood cell count


(3) Hyperglycemia due to type 2 diabetes mellitus:  


Qualified Codes:  E11.65 - Type 2 diabetes mellitus with hyperglycemia








Johan Russo MD Mar 22, 2018 14:25

## 2018-03-22 NOTE — PD.CARD.PN
Subjective


Subjective Remarks


No CP or SOB, feels better





Objective


Medications





Current Medications








 Medications


  (Trade)  Dose


 Ordered  Sig/Rogelio


 Route  Start Time


 Stop Time Status Last Admin


 


 Sodium Phosphate


 15 mmol/Sodium


 Chloride  105 ml @ 


 25 mls/hr  UNSCH  PRN


 IV  3/17/18 12:30


     


 


 


  (NS Flush)  2 ml  UNSCH  PRN


 IV FLUSH  3/17/18 13:15


     


 


 


  (NS Flush)  2 ml  BID


 IV FLUSH  3/17/18 21:00


    3/21/18 21:29


 


 


  (Tears Naturale


 Opth Soln)  1 drop  TID


 EACH EYE  3/17/18 18:00


    3/19/18 17:19


 


 


  (Zofran Inj)  4 mg  Q6H  PRN


 IV PUSH  3/17/18 13:15


     


 


 


  (Albuterol Neb)  2.5 mg  Q2HR NEB  PRN


 INH  3/17/18 13:15


     


 


 


 Miscellaneous


 Information  1  Q361D


 XX  3/17/18 13:15


    3/17/18 20:53


 


 


  (Chlorhexidine


 2% Cloth)  3 pack


 Taper  DAILY@04


 TOP  3/18/18 04:00


 3/14/19 03:59  3/21/18 04:09


 


 


  (Chlorhexidine


 2% Cloth)  3 pack  UNSCH  PRN


 TOP  3/17/18 13:15


     


 


 


  (Sully-Colace)  1 tab  BID


 PO  3/17/18 21:00


    3/22/18 10:29


 


 


  (Milk Of


 Magnesia Liq)  30 ml  Q12H  PRN


 PO  3/17/18 13:15


     


 


 


  (Senokot)  17.2 mg  Q12H  PRN


 PO  3/17/18 13:15


     


 


 


  (Dulcolax Supp)  10 mg  DAILY  PRN


 RECTAL  3/17/18 13:15


     


 


 


  (Lactulose Liq)  30 ml  DAILY  PRN


 PO  3/17/18 13:15


    3/17/18 20:48


 


 


  (Peridex 0.12%


 Liq)  15 ml  BID@08,20


 MT  3/17/18 20:00


    3/19/18 20:23


 


 


 Thiamine HCl 100


 mg/Sodium Chloride  101 ml @ 


 101 mls/hr  DAILY


 IV  3/18/18 09:00


    3/21/18 10:02


 


 


  (Folate)  1 mg  DAILY


 PO  3/18/18 09:00


    3/22/18 10:29


 


 


  (Theragran)  1 tab  DAILY


 PO  3/18/18 09:00


    3/22/18 10:29


 


 


  (Brethine Inj)  1 mg  UNSCH  PRN


 SQ  3/17/18 16:30


     


 


 


  (NS Flush)    DAILY


 IV FLUSH  3/17/18 17:00


    3/22/18 09:00


 


 


  (NS Flush)    UNSCH  PRN


 IV FLUSH  3/17/18 17:00


     


 


 


  (Aspirin Chew)  81 mg  DAILY


 CHEW  3/19/18 09:00


    3/22/18 10:31


 


 


  (Levemir Inj)  10 units  BID


 SQ  3/19/18 21:00


    3/22/18 10:30


 


 


  (Lactulose Liq)  30 ml  Q4H


 PO  3/19/18 13:00


    3/21/18 21:24


 


 


  (D50w (Vial) Inj)  50 ml  UNSCH  PRN


 IV PUSH  3/19/18 09:30


     


 


 


  (Glucagon Inj)  1 mg  UNSCH  PRN


 OTHER  3/19/18 09:30


     


 


 


  (NovoLIN R


 SUPPLEMENTAL


 SCALE)  1  Q4HR


 SQ  3/19/18 09:30


    3/22/18 12:00


 


 


  (Lopressor)  25 mg  Q12HR


 PO  3/19/18 10:00


    3/22/18 10:29


 


 


  (Apresoline Inj)  10 mg  Q6H  PRN


 IV PUSH  3/19/18 10:00


    3/21/18 10:42


 


 


 Piperacillin Sod/


 Tazobactam Sod  50 ml @ 


 100 mls/hr  Q6H


 IV  3/20/18 14:00


    3/22/18 10:28


 


 


  (Pepcid Inj)  10 mg  Q12HR


 IV PUSH  3/20/18 21:00


    3/22/18 10:29


 


 


  (Ativan Inj)  1 mg  Q2H  PRN


 IV PUSH  3/21/18 09:30


     


 


 


  (Lovenox Inj)  40 mg  Q24H


 SQ  3/21/18 11:00


    3/22/18 10:30


 








Vital Signs / I&O





Vital Signs








  Date Time  Temp Pulse Resp B/P (MAP) Pulse Ox O2 Delivery O2 Flow Rate FiO2


 


3/22/18 12:00 98.3 83 18  100   


 


3/22/18 08:00 98.6 81 18 184/104 (130) 97   


 


3/22/18 04:00 98.5 82 20 165/89 (114) 95   


 


3/22/18 00:00 98.8 88 20 176/94 (121) 96   


 


3/21/18 20:00  87      


 


3/21/18 18:00 98.5 96 22 176/97 (123) 99   


 


3/21/18 16:00 98.3 95 24 126/73 (90) 97   


 


3/21/18 16:00  95      














I/O      


 


 3/21/18 3/21/18 3/21/18 3/22/18 3/22/18 3/22/18





 07:00 15:00 23:00 07:00 15:00 23:00


 


Intake Total 530 ml 100 ml 480 ml   


 


Output Total 1075 ml  600 ml 1400 ml  


 


Balance -545 ml 100 ml -120 ml -1400 ml  


 


      


 


Intake Oral 480 ml  480 ml   


 


IV Total 50 ml 100 ml    


 


Output Urine Total 725 ml  600 ml 1400 ml  


 


Stool Total 350 ml     


 


# Voids 4  2   


 


# Bowel Movements 3   2  








Physical Exam


GENERAL: In NAD


SKIN: Warm and dry.


HEAD: Normocephalic.


EYES: No scleral icterus. No injection or drainage. 


NECK: Supple, trachea midline. No JVD or lymphadenopathy.


CARDIOVASCULAR: Regular rate and rhythm without murmurs, gallops, or rubs. 


RESPIRATORY: Breath sounds equal bilaterally. No accessory muscle use.


GASTROINTESTINAL: Abdomen soft, non-tender, nondistended. 


MUSCULOSKELETAL: No cyanosis, or edema.





Laboratory





Laboratory Tests








Test


  3/22/18


07:00


 


White Blood Count 8.9 TH/MM3 


 


Red Blood Count 3.67 MIL/MM3 


 


Hemoglobin 12.1 GM/DL 


 


Hematocrit 34.9 % 


 


Mean Corpuscular Volume 95.1 FL 


 


Mean Corpuscular Hemoglobin 32.9 PG 


 


Mean Corpuscular Hemoglobin


Concent 34.6 % 


 


 


Red Cell Distribution Width 13.1 % 


 


Platelet Count 141 TH/MM3 


 


Mean Platelet Volume 8.9 FL 


 


Neutrophils (%) (Auto) 75.3 % 


 


Lymphocytes (%) (Auto) 11.3 % 


 


Monocytes (%) (Auto) 12.3 % 


 


Eosinophils (%) (Auto) 0.6 % 


 


Basophils (%) (Auto) 0.5 % 


 


Neutrophils # (Auto) 6.7 TH/MM3 


 


Lymphocytes # (Auto) 1.0 TH/MM3 


 


Monocytes # (Auto) 1.1 TH/MM3 


 


Eosinophils # (Auto) 0.1 TH/MM3 


 


Basophils # (Auto) 0.0 TH/MM3 


 


CBC Comment AUTO DIFF 


 


Differential Comment


  AUTO DIFF


CONFIRMED


 


Platelet Estimate LOW 


 


Platelet Morphology Comment NORMAL 


 


Hematology Comments  


 


Blood Urea Nitrogen 19 MG/DL 


 


Creatinine 1.33 MG/DL 


 


Random Glucose 233 MG/DL 


 


Total Protein 6.2 GM/DL 


 


Albumin 2.6 GM/DL 


 


Calcium Level 8.3 MG/DL 


 


Phosphorus Level 3.4 MG/DL 


 


Magnesium Level 2.3 MG/DL 


 


Alkaline Phosphatase 171 U/L 


 


Aspartate Amino Transf


(AST/SGOT) 66 U/L 


 


 


Alanine Aminotransferase


(ALT/SGPT) 80 U/L 


 


 


Total Bilirubin 0.7 MG/DL 


 


Sodium Level 144 MEQ/L 


 


Potassium Level 4.9 MEQ/L 


 


Chloride Level 111 MEQ/L 


 


Carbon Dioxide Level 21.2 MEQ/L 


 


Anion Gap 12 MEQ/L 


 


Estimat Glomerular Filtration


Rate 55 ML/MIN 


 











Assessment and Plan


Problem List:  


(1) Respiratory failure


ICD Codes:  J96.90 - Respiratory failure, unspecified, unspecified whether with 

hypoxia or hypercapnia


Status:  Acute


(2) Elevated troponin


ICD Codes:  R74.8 - Abnormal levels of other serum enzymes


(3) Sepsis


ICD Codes:  A41.9 - Sepsis, unspecified organism


Status:  Acute


(4) DKA (diabetic ketoacidoses)


ICD Codes:  E13.10 - Other specified diabetes mellitus with ketoacidosis 

without coma


Status:  Acute


(5) Acute metabolic encephalopathy


ICD Codes:  G93.41 - Metabolic encephalopathy


(6) Acute kidney injury


ICD Codes:  N17.9 - Acute kidney failure, unspecified


(7) ETOH abuse


ICD Codes:  F10.10 - Alcohol abuse, uncomplicated


Assessment and Plan


Remains stable from cardiac standpoint. Echo with nl LV fx. Rhythm remains 

stable. Increase activity, PT. Counseled again to quit drinking ETOH. 

Anticipate discharge soon. Will need to follow up with physicians in [place].





Problem Qualifiers





(1) Respiratory failure:  


Qualified Codes:  J96.00 - Acute respiratory failure, unspecified whether with 

hypoxia or hypercapnia


(2) Sepsis:  


Qualified Codes:  A41.9 - Sepsis, unspecified organism


(3) DKA (diabetic ketoacidoses):  


Qualified Codes:  E10.11 - Type 1 diabetes mellitus with ketoacidosis with coma








Silvia Baltazar MD Mar 22, 2018 13:35

## 2018-03-23 VITALS
SYSTOLIC BLOOD PRESSURE: 161 MMHG | RESPIRATION RATE: 18 BRPM | DIASTOLIC BLOOD PRESSURE: 87 MMHG | HEART RATE: 78 BPM | TEMPERATURE: 97.9 F | OXYGEN SATURATION: 96 %

## 2018-03-23 VITALS
RESPIRATION RATE: 18 BRPM | TEMPERATURE: 98.2 F | DIASTOLIC BLOOD PRESSURE: 74 MMHG | HEART RATE: 76 BPM | OXYGEN SATURATION: 96 % | SYSTOLIC BLOOD PRESSURE: 140 MMHG

## 2018-03-23 VITALS
RESPIRATION RATE: 18 BRPM | TEMPERATURE: 98.8 F | HEART RATE: 81 BPM | SYSTOLIC BLOOD PRESSURE: 125 MMHG | OXYGEN SATURATION: 94 % | DIASTOLIC BLOOD PRESSURE: 72 MMHG

## 2018-03-23 VITALS — HEART RATE: 70 BPM

## 2018-03-23 RX ADMIN — Medication SCH ML: at 09:35

## 2018-03-23 RX ADMIN — HUMAN INSULIN SCH UNITS: 100 INJECTION, SOLUTION SUBCUTANEOUS at 09:32

## 2018-03-23 RX ADMIN — METOPROLOL TARTRATE SCH MG: 25 TABLET, FILM COATED ORAL at 09:31

## 2018-03-23 RX ADMIN — ENOXAPARIN SODIUM SCH MG: 40 INJECTION SUBCUTANEOUS at 09:31

## 2018-03-23 RX ADMIN — FOLIC ACID SCH MG: 1 TABLET ORAL at 09:32

## 2018-03-23 RX ADMIN — HUMAN INSULIN SCH: 100 INJECTION, SOLUTION SUBCUTANEOUS at 00:00

## 2018-03-23 RX ADMIN — THIAMINE HYDROCHLORIDE SCH MLS/HR: 100 INJECTION, SOLUTION INTRAMUSCULAR; INTRAVENOUS at 04:20

## 2018-03-23 RX ADMIN — HUMAN INSULIN SCH: 100 INJECTION, SOLUTION SUBCUTANEOUS at 04:00

## 2018-03-23 RX ADMIN — LEVOFLOXACIN SCH MG: 500 TABLET, FILM COATED ORAL at 09:31

## 2018-03-23 RX ADMIN — HUMAN INSULIN SCH: 100 INJECTION, SOLUTION SUBCUTANEOUS at 09:33

## 2018-03-23 RX ADMIN — ACYCLOVIR SCH UNITS: 800 TABLET ORAL at 09:32

## 2018-03-23 RX ADMIN — ASPIRIN 81 MG SCH MG: 81 TABLET ORAL at 09:31

## 2018-03-23 RX ADMIN — STANDARDIZED SENNA CONCENTRATE AND DOCUSATE SODIUM SCH TAB: 8.6; 5 TABLET, FILM COATED ORAL at 09:31

## 2018-03-23 RX ADMIN — POLYVINYL ALCOHOL SCH DROP: 14 SOLUTION/ DROPS OPHTHALMIC at 09:30

## 2018-03-23 RX ADMIN — ACYCLOVIR SCH TAB: 800 TABLET ORAL at 09:31

## 2018-03-23 RX ADMIN — NIFEDIPINE SCH MG: 30 TABLET, FILM COATED, EXTENDED RELEASE ORAL at 09:32

## 2018-03-23 NOTE — HHI.PR
Subjective


Remarks


awake and alert


no complains


no nausea or vomiting





Objective


Vitals





Vital Signs








  Date Time  Temp Pulse Resp B/P (MAP) Pulse Ox O2 Delivery O2 Flow Rate FiO2


 


3/23/18 04:00 98.2 76 18 140/74 (96) 96   


 


3/23/18 04:00  74      


 


3/23/18 00:40  70      


 


3/23/18 00:00 98.8 81 18 125/72 (89) 94   


 


3/22/18 20:03  82      


 


3/22/18 16:00 98.7 84 18 173/85 (114) 96   


 


3/22/18 12:00 98.3 83 18 168/87 (114) 100   


 


3/22/18 08:00 98.6 81 18 184/104 (130) 97   














I/O      


 


 3/22/18 3/22/18 3/22/18 3/23/18 3/23/18 3/23/18





 07:00 15:00 23:00 07:00 15:00 23:00


 


Intake Total   1500 ml   


 


Output Total 1400 ml  2500 ml 1300 ml  


 


Balance -1400 ml  -1000 ml -1300 ml  


 


      


 


Intake Oral   1500 ml   


 


Output Urine Total 1400 ml  2500 ml 1300 ml  


 


# Bowel Movements 2  0   








Result Diagram:  


3/22/18 0700                                                                   

             3/22/18 0700





Imaging





Last Impressions








Chest X-Ray 3/19/18 0600 Signed





Impressions: 





 Service Date/Time:  Monday, March 19, 2018 05:23 - CONCLUSION: The lungs are 





 clear.     Luca Mcgrath MD 


 


Abdomen/Pelvis CT 3/19/18 0000 Signed





Impressions: 





 Service Date/Time:  Monday, March 19, 2018 15:58 - CONCLUSION:  1. 

Unremarkable 





 unenhanced CT appearance of the pancreas. No gross peripancreatic inflammatory 





 change, pancreatic ductal dilatation or peripancreatic fluid collections. 

Please 





 note that CT examination is not sensitive for acute uncomplicated pancreatitis

, 





 particularly without IV contrast. 2. Trace bilateral pleural effusions with 





 minimal bibasilar consolidation, likely atelectasis. 3. Very trace free fluid 





 along the inferior margin of the liver. 4. Ancillary findings include colonic 





 diverticulosis, left renal cyst and degenerative spondylosis of the lumbar 





 spine.     Ye Mccall MD 


 


Head CT 3/17/18 1217 Signed





Impressions: 





 Service Date/Time:  Saturday, March 17, 2018 12:58 - CONCLUSION:  1. No acute 





 intracranial abnormality.     Benjamín Guerra MD 


 


Liver Ultrasound 3/17/18 0000 Signed





Impressions: 





 Service Date/Time:  Saturday, March 17, 2018 13:50 - CONCLUSION:  The liver is 





 normal in size and echogenicity. No evidence of mass. There are large but 





 benign-appearing left renal cyst present..     Kori Hernandez MD 








Objective Remarks


awake and alert  oriented x 3 anicteric


lungs clear


regular rhythm


abdomen soft, nontender


extremities no edema- good peripheral pulses


neuro exam -unremarkable


neuro exam - unremarkable


Date of Insertion:  Mar 17, 2018


Line:  Central Venous Catheter


Side:  Left


Location:  Internal, Jugular





A/P


Problem List:  


(1) Acute respiratory failure


ICD Code:  J96.00 - Acute respiratory failure, unspecified whether with hypoxia 

or hypercapnia


(2) Thrombocytosis


ICD Code:  D47.3 - Essential (hemorrhagic) thrombocythemia


(3) Macrocytosis


ICD Code:  D75.89 - Other specified diseases of blood and blood-forming organs


(4) Increased ammonia level


ICD Code:  R79.89 - Other specified abnormal findings of blood chemistry


(5) Leukocytosis


ICD Code:  D72.829 - Elevated white blood cell count, unspecified


(6) Glycosuria


ICD Code:  R81 - Glycosuria


(7) Acute metabolic encephalopathy


ICD Code:  G93.41 - Metabolic encephalopathy


(8) Cystitis


ICD Code:  N30.90 - Cystitis, unspecified without hematuria


(9) Acute kidney injury


ICD Code:  N17.9 - Acute kidney failure, unspecified


(10) Hyperkalemia


ICD Code:  E87.5 - Hyperkalemia


(11) Hyponatremia


ICD Code:  E87.1 - Hypo-osmolality and hyponatremia


(12) Hyperglycemia due to type 2 diabetes mellitus


ICD Code:  E11.65 - Type 2 diabetes mellitus with hyperglycemia


Assessment and Plan


Metabolic encephalopathy secondary to DKA- MS improved


very non compliant with diet 


- reinforced diabetes education and compliance


- increased levemir to 15 units bid with Novolog 8 units tid


- patient has own home regimen- will resume when he gets home- he refused some 

of insulin coverage





History of hypertension- BP readings improved


Elevated troponin likely type II non-STEMI due to demand ischemia





Lopressor 25mg Q12 monitor HR and BP keep MAP>65mmHg


Cards is following- Dr. Baltazar. Echo showed EF 55-60%


On ASA 81mg daily, 


ADd CCB- procardia 30 mg XL for beter control


may eventually benefit from ACE- not now  with recent CHAU








S/P Acute hypoxemic respiratory failure- resolved


Increase ambulation








Hyperammonemia


Elevated Lipase level. - asymptomatic





improved


DC Lactulose.  


Liver ultrasound revealed no acute finding.  Hepatitis panel negative.


Monitor Lipase level- now within normal


CT abd/pelvis:. No gross peripancreatic inflammatory change, pancreatic ductal 

dilatation or peripancreatic fluid collections, colonic diverticulosis, left 

renal cyst.








Hypokalemia/hypophosphatemia


improved





Acute kidney injury renal functions imroved- likely with underlying CKI from DM 

nephropathy


Hypernatremia- improved





Monitor renal function, I/O's, avoid nephrotoxins. Electrolytes replacement as 

needed


Renal function is improving with Cr: 1.5 today


ff BMP with PCP











Leukocytosis neutrophil predominant


Normocytic anemia


Cystitis


some reactive leukocytosis from stress


On piperacillin/tazobactam monitor for signs of infections ( Fever, WBC)


Received 1 dose of vancomycin ED


Sputum --beta strep


Blood cultures Influenza screening, strep pneumonia and Legionella urinary Ag 

all negative


change to po Levaquin x 2 days then DC





chronic alcohol use- counselled


- Librium 25 mg po tid x 2 days then DC











-GI -famotidine


-DVT -SCD..  Lovenox 40 mg subcu daily








DC today


OP ff up with PCP in [place]





Problem Qualifiers





(1) Acute respiratory failure:  


Qualified Codes:  J96.00 - Acute respiratory failure, unspecified whether with 

hypoxia or hypercapnia


(2) Leukocytosis:  


Qualified Codes:  D72.828 - Other elevated white blood cell count


(3) Hyperglycemia due to type 2 diabetes mellitus:  


Qualified Codes:  E11.65 - Type 2 diabetes mellitus with hyperglycemia








Johan Russo MD Mar 23, 2018 07:37

## 2018-03-26 NOTE — HHI.DS
__________________________________________________





Discharge Summary


Admission Date


Mar 17, 2018 at 12:39


Discharge Date:  Mar 23, 2018


Admitting Diagnosis





DKA, metabolic acidosis, metabolic encephalopathy





(1) Acute respiratory failure


ICD Code:  J96.00 - Acute respiratory failure, unspecified whether with hypoxia 

or hypercapnia


Diagnosis:  Principal





(2) DKA (diabetic ketoacidoses)


ICD Code:  E13.10 - Other specified diabetes mellitus with ketoacidosis without 

coma


Diagnosis:  Principal





(3) Thrombocytosis


ICD Code:  D47.3 - Essential (hemorrhagic) thrombocythemia


Diagnosis:  Secondary





(4) Acute metabolic encephalopathy


ICD Code:  G93.41 - Metabolic encephalopathy


Diagnosis:  Principal





(5) Macrocytosis


ICD Code:  D75.89 - Other specified diseases of blood and blood-forming organs


(6) Increased ammonia level


ICD Code:  R79.89 - Other specified abnormal findings of blood chemistry


Diagnosis:  Secondary





(7) Leukocytosis


ICD Code:  D72.829 - Elevated white blood cell count, unspecified


Diagnosis:  Secondary





(8) Acute kidney injury


ICD Code:  N17.9 - Acute kidney failure, unspecified


Diagnosis:  Secondary





(9) Hyperkalemia


ICD Code:  E87.5 - Hyperkalemia


(10) Hyponatremia


ICD Code:  E87.1 - Hypo-osmolality and hyponatremia


(11) Hyperglycemia due to type 2 diabetes mellitus


ICD Code:  E11.65 - Type 2 diabetes mellitus with hyperglycemia


Procedures


3/17- central line placement, endotracheal intubation


Brief History - From Admission


62-year-old  male.  Date of admission 3/17/2018.  Past medical history 

includes diabetes according to friend per ER physician who is currently 

unavailable.  This individual presented to the emergency room by EMS after he 

was found unresponsive this morning by his friend in the hotel room.   When EMS 

arrived and checked his blood sugar the meter read high.  They brought him 

unresponsive with GCS of 3.  Vital signs were otherwise stable.  Patient was in 

no condition to give any meaningful history.  The bedside blood sugar in the 

emergency room read high as well.  There is no family or friend currently with 

the patient.  He was last seen normal last night. 





Patient received 20 mg etomidate and 100 mg succinylcholine was extubated with 

a 7.5 ET tube.  CT brain revealed no acute intracranial findings.  His white 

blood cell count is 41,000.  He has a macrocytosis on his lab draws ammonia 

levels 161.  Received  vancomycin and piperacillin/tazobactam in the emergency 

department.  He was started on lactulose 30 cc 4 times daily.  EKG revealed 

normal sinus rhythm 83.  Incomplete right bundle branch block.  Troponin is 

currently pending.  Creatinine was 2.4.  Potassium is 6.3.  Sodium was 127.  

Troponin 0 0.04.  TSH 1.19.  Patient received 10 mg insulin R and started on a 

drip currently 8 units an hour.  3 L normal saline wide open.  Currently normal 

saline at 250 cc now.  We are asked to admit.


CBC/BMP:  


3/22/18 0700                                                                   

             3/22/18 0700





Imaging





Last Impressions








Chest X-Ray 3/19/18 0600 Signed





Impressions: 





 Service Date/Time:  Monday, March 19, 2018 05:23 - CONCLUSION: The lungs are 





 clear.     Luca Mcgrath MD 


 


Abdomen/Pelvis CT 3/19/18 0000 Signed





Impressions: 





 Service Date/Time:  Monday, March 19, 2018 15:58 - CONCLUSION:  1. 

Unremarkable 





 unenhanced CT appearance of the pancreas. No gross peripancreatic inflammatory 





 change, pancreatic ductal dilatation or peripancreatic fluid collections. 

Please 





 note that CT examination is not sensitive for acute uncomplicated pancreatitis

, 





 particularly without IV contrast. 2. Trace bilateral pleural effusions with 





 minimal bibasilar consolidation, likely atelectasis. 3. Very trace free fluid 





 along the inferior margin of the liver. 4. Ancillary findings include colonic 





 diverticulosis, left renal cyst and degenerative spondylosis of the lumbar 





 spine.     Ye Mccall MD 


 


Head CT 3/17/18 1217 Signed





Impressions: 





 Service Date/Time:  Saturday, March 17, 2018 12:58 - CONCLUSION:  1. No acute 





 intracranial abnormality.     Benjamín Guerra MD 


 


Liver Ultrasound 3/17/18 0000 Signed





Impressions: 





 Service Date/Time:  Saturday, March 17, 2018 13:50 - CONCLUSION:  The liver is 





 normal in size and echogenicity. No evidence of mass. There are large but 





 benign-appearing left renal cyst present..     Kori Hernandez MD 








PE at Discharge


awake and alert  oriented x 3 anicteric


lungs clear


regular rhythm


abdomen soft, nontender


extremities no edema- good peripheral pulses


neuro exam -unremarkable


neuro exam - unremarkable


Pt update on day of discharge


awake and alert


motivated with lifestyle changes


Hospital Course


Metabolic encephalopathy secondary to DKA- MS improved


very non compliant with diet 


- reinforced diabetes education and compliance


- increased levemir to 15 units bid with Novolog 8 units tid


- patient has own home regimen- will resume when he gets home- he refused some 

of insulin coverage





History of hypertension- BP readings improved


Elevated troponin likely type II non-STEMI due to demand ischemia





Lopressor 25mg Q12 monitor HR and BP keep MAP>65mmHg


Cards is following- Dr. Baltazar. Echo showed EF 55-60%


On ASA 81mg daily, 


ADd CCB- procardia 30 mg XL for beter control


may eventually benefit from ACE- not now  with recent CHAU








S/P Acute hypoxemic respiratory failure- resolved


Increase ambulation








Hyperammonemia


Elevated Lipase level. - asymptomatic





improved


DC Lactulose.  


Liver ultrasound revealed no acute finding.  Hepatitis panel negative.


Monitor Lipase level- now within normal


CT abd/pelvis:. No gross peripancreatic inflammatory change, pancreatic ductal 

dilatation or peripancreatic fluid collections, colonic diverticulosis, left 

renal cyst.








Hypokalemia/hypophosphatemia


improved





Acute kidney injury renal functions imroved- likely with underlying CKI from DM 

nephropathy


Hypernatremia- improved





Monitor renal function, I/O's, avoid nephrotoxins. Electrolytes replacement as 

needed


Renal function is improving with Cr: 1.5 today


ff BMP with PCP











Leukocytosis neutrophil predominant


Normocytic anemia


Cystitis


some reactive leukocytosis from stress


On piperacillin/tazobactam monitor for signs of infections ( Fever, WBC)


Received 1 dose of vancomycin ED


Sputum --beta strep


Blood cultures Influenza screening, strep pneumonia and Legionella urinary Ag 

all negative


change to po Levaquin x 2 days then DC





chronic alcohol use- counselled


- Librium 25 mg po tid x 2 days then DC











-GI -famotidine


-DVT -SCD..  Lovenox 40 mg subcu daily








DC today


OP ff up with PCP in [place]


Pt Condition on Discharge:  Stable


Discharge Disposition:  Discharge Home


Discharge Time:  > 30 minutes


Discharge Instructions


DIET: Follow Instructions for:  Heart Healthy Diet, Diabetic Diet


Speech Therapy-Diet Recommends:  Regular


Activities you can perform:  Weight Bearing as Arnaldo


Activities to Avoid:  Strenuous Activity


Follow up Referrals:  


PCP Follow-up - 03/26/18 with PCP





New Orders:  


COMP MET PROF (CMP) - 03/26/18





New Medications:  


Aspirin (Tgt Aspirin) 81 Mg Chw


81 MG CHEW DAILY for CAD for 30 Days, EA





Chlordiazepoxide HCl (Chlordiazepoxide HCl) 25 Mg Capsule


25 MG PO TID PRN for SEVERE ANXIETY OR AGITATION for 2 Days, #6 TAB





Levofloxacin (Levaquin) 500 Mg Tablet


500 MG PO DAILY for CYS for 1 Day, #1 TAB





Metoprolol Tartrate (Metoprolol Tartrate) 25 Mg Tab


25 MG PO Q12HR for HTN for 30 Days, TAB





Multivitamin with Folic Acid (Thera Tablet) 400 Mcg Tablet


1 TAB PO DAILY for ETO for 30 Days, #30 TAB





Nifedipine ER 24 HR (Nifedipine ER 24 HR) 30 Mg Tab


30 MG PO DAILY for HTN for 30 Days, #30 TAB





 


Continued Medications:  


Insulin Aspart Protam-Asp 70-30 Inj (Novolog Mix 70-30 FlexPen Inj) 300 Unit/3 

Ml Pen


12 UNITS SQ TID for Blood Sugar Management, #1 PEN 0 Refills





Insulin Glargine Inj (Lantus Inj) 1,000 Unit/10 Ml Vial


40 UNITS SQ HS for Blood Sugar Management, VIAL 0 Refills

















Johan Russo MD Mar 26, 2018 08:39